# Patient Record
Sex: MALE | Race: WHITE | NOT HISPANIC OR LATINO | Employment: FULL TIME | URBAN - METROPOLITAN AREA
[De-identification: names, ages, dates, MRNs, and addresses within clinical notes are randomized per-mention and may not be internally consistent; named-entity substitution may affect disease eponyms.]

---

## 2023-08-11 ENCOUNTER — HOSPITAL ENCOUNTER (OUTPATIENT)
Dept: DATA CONVERSION | Facility: HOSPITAL | Age: 63
Discharge: HOME | End: 2023-08-11

## 2023-08-11 DIAGNOSIS — L72.3 SEBACEOUS CYST: ICD-10-CM

## 2023-08-28 PROBLEM — I10 BENIGN ESSENTIAL HYPERTENSION: Status: ACTIVE | Noted: 2023-08-28

## 2023-08-28 PROBLEM — B00.89 HERPES SIMPLEX VIRUS TYPE 1 (HSV-1) DERMATITIS: Status: ACTIVE | Noted: 2023-08-28

## 2023-08-28 PROBLEM — L08.9 INFECTED SEBACEOUS CYST: Status: ACTIVE | Noted: 2023-08-28

## 2023-08-28 PROBLEM — E78.00 PURE HYPERCHOLESTEROLEMIA: Status: ACTIVE | Noted: 2023-08-28

## 2023-08-28 PROBLEM — N18.30 STAGE 3 CHRONIC KIDNEY DISEASE (MULTI): Status: ACTIVE | Noted: 2023-08-28

## 2023-08-28 PROBLEM — L71.9 ROSACEA: Status: ACTIVE | Noted: 2023-08-28

## 2023-08-28 PROBLEM — E04.1 NONTOXIC UNINODULAR GOITER: Status: ACTIVE | Noted: 2023-08-28

## 2023-08-28 PROBLEM — R73.9 HYPERGLYCEMIA: Status: ACTIVE | Noted: 2023-08-28

## 2023-08-28 PROBLEM — E03.8 SUBCLINICAL HYPOTHYROIDISM: Status: ACTIVE | Noted: 2023-08-28

## 2023-08-28 PROBLEM — B35.1 ONYCHOMYCOSIS OF TOENAIL: Status: ACTIVE | Noted: 2023-08-28

## 2023-08-28 PROBLEM — E78.5 HYPERLIPIDEMIA: Status: ACTIVE | Noted: 2023-08-28

## 2023-08-28 PROBLEM — D72.819 LEUKOPENIA: Status: ACTIVE | Noted: 2023-08-28

## 2023-08-28 PROBLEM — L72.3 INFECTED SEBACEOUS CYST: Status: ACTIVE | Noted: 2023-08-28

## 2023-08-28 PROBLEM — E87.5 HYPERKALEMIA: Status: ACTIVE | Noted: 2023-08-28

## 2023-08-28 PROBLEM — R79.9 ABNORMAL BLOOD CHEMISTRY: Status: ACTIVE | Noted: 2023-08-28

## 2023-08-28 PROBLEM — L72.0 EPIDERMAL INCLUSION CYST: Status: ACTIVE | Noted: 2023-08-28

## 2023-08-28 PROBLEM — N28.9 ABNORMAL RENAL FUNCTION: Status: ACTIVE | Noted: 2023-08-28

## 2023-08-28 PROBLEM — I10 HYPERTENSION: Status: ACTIVE | Noted: 2023-08-28

## 2023-08-28 PROBLEM — R79.89 ABNORMAL TSH: Status: ACTIVE | Noted: 2023-08-28

## 2023-08-28 PROBLEM — L60.0 INGROWN TOENAIL: Status: ACTIVE | Noted: 2023-08-28

## 2023-08-28 PROBLEM — R00.1 BRADYCARDIA: Status: ACTIVE | Noted: 2023-08-28

## 2023-08-28 PROBLEM — E78.1 HYPERTRIGLYCERIDEMIA: Status: ACTIVE | Noted: 2023-08-28

## 2023-08-28 RX ORDER — HYDROCHLOROTHIAZIDE 25 MG/1
25 TABLET ORAL DAILY
COMMUNITY
End: 2024-01-19 | Stop reason: SDUPTHER

## 2023-08-28 RX ORDER — LOSARTAN POTASSIUM 100 MG/1
TABLET ORAL
COMMUNITY
End: 2024-03-18 | Stop reason: SDUPTHER

## 2023-08-28 RX ORDER — TERBINAFINE HYDROCHLORIDE 250 MG/1
TABLET ORAL
COMMUNITY
Start: 2022-06-30

## 2023-08-28 RX ORDER — AMLODIPINE BESYLATE 10 MG/1
2 TABLET ORAL DAILY
COMMUNITY
Start: 2015-01-07

## 2023-08-28 RX ORDER — LISINOPRIL 10 MG/1
1 TABLET ORAL DAILY
COMMUNITY
Start: 2019-12-12

## 2023-08-28 RX ORDER — VALACYCLOVIR HYDROCHLORIDE 500 MG/1
1 TABLET, FILM COATED ORAL 2 TIMES DAILY
COMMUNITY
Start: 2018-11-06

## 2023-08-28 RX ORDER — VALACYCLOVIR HYDROCHLORIDE 1 G/1
1000 TABLET, FILM COATED ORAL DAILY
COMMUNITY
Start: 2020-09-25

## 2023-08-28 RX ORDER — AMLODIPINE BESYLATE 5 MG/1
5 TABLET ORAL 2 TIMES DAILY
COMMUNITY
Start: 2022-11-03

## 2023-08-28 RX ORDER — METOPROLOL TARTRATE 50 MG/1
50 TABLET ORAL DAILY
COMMUNITY
Start: 2014-03-12 | End: 2024-03-18 | Stop reason: SDUPTHER

## 2023-08-28 RX ORDER — METRONIDAZOLE 7.5 MG/G
GEL TOPICAL
COMMUNITY
Start: 2022-06-30 | End: 2023-10-19 | Stop reason: ALTCHOICE

## 2023-08-28 RX ORDER — FENOFIBRATE 54 MG/1
1 TABLET ORAL DAILY
COMMUNITY
Start: 2017-01-14

## 2023-08-28 RX ORDER — ATORVASTATIN CALCIUM 20 MG/1
TABLET, FILM COATED ORAL
COMMUNITY

## 2023-09-15 VITALS
DIASTOLIC BLOOD PRESSURE: 71 MMHG | WEIGHT: 196.4 LBS | OXYGEN SATURATION: 99 % | SYSTOLIC BLOOD PRESSURE: 112 MMHG | HEIGHT: 70 IN | HEART RATE: 43 BPM | TEMPERATURE: 97.2 F | BODY MASS INDEX: 28.12 KG/M2

## 2023-10-19 ENCOUNTER — OFFICE VISIT (OUTPATIENT)
Dept: PRIMARY CARE | Facility: CLINIC | Age: 63
End: 2023-10-19
Payer: MEDICARE

## 2023-10-19 VITALS
DIASTOLIC BLOOD PRESSURE: 70 MMHG | WEIGHT: 200 LBS | RESPIRATION RATE: 20 BRPM | OXYGEN SATURATION: 98 % | TEMPERATURE: 98.8 F | SYSTOLIC BLOOD PRESSURE: 132 MMHG | BODY MASS INDEX: 29.62 KG/M2 | HEIGHT: 69 IN | HEART RATE: 71 BPM

## 2023-10-19 DIAGNOSIS — B35.1 ONYCHOMYCOSIS: ICD-10-CM

## 2023-10-19 DIAGNOSIS — L02.212 ABSCESS OF BACK: Primary | ICD-10-CM

## 2023-10-19 PROCEDURE — 3078F DIAST BP <80 MM HG: CPT | Performed by: FAMILY MEDICINE

## 2023-10-19 PROCEDURE — 99213 OFFICE O/P EST LOW 20 MIN: CPT | Performed by: FAMILY MEDICINE

## 2023-10-19 PROCEDURE — 1036F TOBACCO NON-USER: CPT | Performed by: FAMILY MEDICINE

## 2023-10-19 PROCEDURE — 3075F SYST BP GE 130 - 139MM HG: CPT | Performed by: FAMILY MEDICINE

## 2023-10-19 RX ORDER — SULFAMETHOXAZOLE AND TRIMETHOPRIM 800; 160 MG/1; MG/1
1 TABLET ORAL 2 TIMES DAILY
Qty: 14 TABLET | Refills: 0 | Status: SHIPPED | OUTPATIENT
Start: 2023-10-19 | End: 2023-10-26

## 2023-10-19 ASSESSMENT — PAIN SCALES - GENERAL: PAINLEVEL: 0-NO PAIN

## 2023-10-19 NOTE — PROGRESS NOTES
"Subjective   Patient ID: Altaf Gaming is a 63 y.o. male who presents for Cyst (PATIENT HAS A CYST ON HIS BACK HE WOULD LIKE LOOKED AT).    HPI He has had some recent redness and swelling of back and slight dc    Review of Systems   Constitutional:  Negative for fever.   Respiratory: Negative.     Skin:         See HPI       Objective   /70 (BP Location: Left arm, Patient Position: Sitting, BP Cuff Size: Adult)   Pulse 71   Temp 37.1 °C (98.8 °F) (Skin)   Resp 20   Ht 1.753 m (5' 9\")   Wt 90.7 kg (200 lb)   SpO2 98%   BMI 29.53 kg/m²     Physical Exam  Skin:     Comments: Mid back with small area of healing abscess with no fluctuance.           Assessment/Plan   Problem List Items Addressed This Visit    None  Visit Diagnoses         Codes    Abscess of back    -  Primary L02.212    Onychomycosis     B35.1          Will observe for self resolution and follow up if any increased symptoms.      "

## 2023-10-21 ASSESSMENT — ENCOUNTER SYMPTOMS
RESPIRATORY NEGATIVE: 1
ROS SKIN COMMENTS: SEE HPI
FEVER: 0

## 2023-11-02 ENCOUNTER — OFFICE VISIT (OUTPATIENT)
Dept: CARDIOLOGY | Facility: CLINIC | Age: 63
End: 2023-11-02
Payer: MEDICARE

## 2023-11-02 VITALS
OXYGEN SATURATION: 98 % | BODY MASS INDEX: 28.94 KG/M2 | WEIGHT: 196 LBS | SYSTOLIC BLOOD PRESSURE: 128 MMHG | HEART RATE: 45 BPM | DIASTOLIC BLOOD PRESSURE: 82 MMHG

## 2023-11-02 DIAGNOSIS — I10 BENIGN ESSENTIAL HYPERTENSION: ICD-10-CM

## 2023-11-02 DIAGNOSIS — E78.00 PURE HYPERCHOLESTEROLEMIA: Primary | ICD-10-CM

## 2023-11-02 PROCEDURE — 3074F SYST BP LT 130 MM HG: CPT | Performed by: INTERNAL MEDICINE

## 2023-11-02 PROCEDURE — 99213 OFFICE O/P EST LOW 20 MIN: CPT | Performed by: INTERNAL MEDICINE

## 2023-11-02 PROCEDURE — 1036F TOBACCO NON-USER: CPT | Performed by: INTERNAL MEDICINE

## 2023-11-02 PROCEDURE — 3079F DIAST BP 80-89 MM HG: CPT | Performed by: INTERNAL MEDICINE

## 2023-11-02 ASSESSMENT — PAIN SCALES - GENERAL: PAINLEVEL: 0-NO PAIN

## 2023-11-02 NOTE — PROGRESS NOTES
Subjective      Chief Complaint   Patient presents with    annual appt           Mr. Gaming is a patient we follow once a year who has high blood pressure and hypercholesterolemia.  He remains active and will ex.  He is not complaining of chest discomfort.  NO PND or orthopnea.  The legs are not swelling on him.  He does not complain of palpitations.   The BP is doing well  The chol was done a year ago and is doing well           ROS     Past Surgical History:   Procedure Laterality Date    CYST REMOVAL  2023    Dr. 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 08/28/2023    Hyperglycemia 08/28/2023    Hyperkalemia 08/28/2023    Hyperlipidemia 08/28/2023    Hypertriglyceridemia 08/28/2023    Hypertension 08/28/2023    Infected sebaceous cyst 08/28/2023    Ingrown toenail 08/28/2023    Leukopenia 08/28/2023    Nontoxic uninodular goiter 08/28/2023    Onychomycosis of toenail 08/28/2023    Pure hypercholesterolemia 08/28/2023    Rosacea 08/28/2023    Stage 3 chronic kidney disease (CMS/HCC) 08/28/2023    Subclinical hypothyroidism 08/28/2023     Resolved Ambulatory Problems     Diagnosis Date Noted    No Resolved Ambulatory Problems     Past Medical History:   Diagnosis Date    Encounter for screening for malignant neoplasm of prostate     Other specified abnormal findings of blood chemistry 07/13/2016    Personal history of other diseases of the circulatory system     Personal history of other diseases of the circulatory system     Personal history of other diseases of the digestive system     Personal history of other endocrine,  "nutritional and metabolic disease         Visit Vitals  /82   Pulse (!) 45   Wt 88.9 kg (196 lb)   SpO2 98%   BMI 28.94 kg/m²   Smoking Status Never   BSA 2.08 m²        Objective     Constitutional:       Appearance: Healthy appearance.   Eyes:      Pupils: Pupils are equal, round, and reactive to light.   Neck:      Vascular: JVD normal.   Pulmonary:      Breath sounds: Normal breath sounds.   Cardiovascular:      PMI at left midclavicular line. Normal rate. Regular rhythm. Normal S1. Normal S2.       Murmurs: There is no murmur.      No gallop.  No click. No rub.   Pulses:     Intact distal pulses.   Edema:     Peripheral edema absent.   Abdominal:      Palpations: Abdomen is soft.      Tenderness: There is no abdominal tenderness.   Musculoskeletal:      Extremities: No clubbing present.Skin:     General: Skin is warm and dry.   Neurological:      General: No focal deficit present.            Lab Review:         Lab Results   Component Value Date    CHOL 133 11/02/2022    CHOL 118 (L) 10/05/2020    CHOL 147 01/04/2019     Lab Results   Component Value Date    HDL 49 11/02/2022    HDL 47 10/05/2020    HDL 48.0 01/04/2019     Lab Results   Component Value Date    LDLCALC 70 11/02/2022    LDLCALC 56 (L) 10/05/2020     Lab Results   Component Value Date    TRIG 70 11/02/2022    TRIG 73 10/05/2020    TRIG 52 01/04/2019     No components found for: \"CHOLHDL\"     Assessment/Plan     Benign essential hypertension  He is doing well and is active.  NO angina and no chf  The BP is doing well    Pure hypercholesterolemia  Is doing well and needs to be checked     "

## 2024-01-19 DIAGNOSIS — I10 BENIGN ESSENTIAL HYPERTENSION: Primary | ICD-10-CM

## 2024-01-21 RX ORDER — HYDROCHLOROTHIAZIDE 25 MG/1
25 TABLET ORAL DAILY
Qty: 90 TABLET | Refills: 3 | Status: SHIPPED | OUTPATIENT
Start: 2024-01-21 | End: 2024-01-22 | Stop reason: SDUPTHER

## 2024-01-22 DIAGNOSIS — I10 BENIGN ESSENTIAL HYPERTENSION: ICD-10-CM

## 2024-01-22 RX ORDER — HYDROCHLOROTHIAZIDE 25 MG/1
25 TABLET ORAL DAILY
Qty: 90 TABLET | Refills: 3 | Status: SHIPPED | OUTPATIENT
Start: 2024-01-22

## 2024-03-18 DIAGNOSIS — I10 BENIGN ESSENTIAL HYPERTENSION: Primary | ICD-10-CM

## 2024-03-18 RX ORDER — LOSARTAN POTASSIUM 100 MG/1
100 TABLET ORAL DAILY
Qty: 30 TABLET | Refills: 0 | Status: SHIPPED | OUTPATIENT
Start: 2024-03-18 | End: 2024-04-09

## 2024-03-18 RX ORDER — METOPROLOL TARTRATE 50 MG/1
50 TABLET ORAL DAILY
Qty: 30 TABLET | Refills: 0 | Status: SHIPPED | OUTPATIENT
Start: 2024-03-18 | End: 2024-04-09

## 2024-04-05 DIAGNOSIS — I10 BENIGN ESSENTIAL HYPERTENSION: ICD-10-CM

## 2024-04-09 RX ORDER — LOSARTAN POTASSIUM 100 MG/1
100 TABLET ORAL DAILY
Qty: 90 TABLET | Refills: 3 | Status: SHIPPED | OUTPATIENT
Start: 2024-04-09 | End: 2025-04-09

## 2024-04-09 RX ORDER — METOPROLOL TARTRATE 50 MG/1
50 TABLET ORAL 2 TIMES DAILY
Qty: 180 TABLET | Refills: 3 | Status: SHIPPED | OUTPATIENT
Start: 2024-04-09 | End: 2025-04-09

## 2024-04-16 NOTE — TELEPHONE ENCOUNTER
Patient left message on script line to refill amlodipine. There are 2 different dosages in his chart. Will need to know dose and frequency. Left message for call back

## 2024-04-18 DIAGNOSIS — I10 BENIGN ESSENTIAL HYPERTENSION: Primary | ICD-10-CM

## 2024-07-03 RX ORDER — AMLODIPINE BESYLATE 10 MG/1
10 TABLET ORAL DAILY
Qty: 90 TABLET | Refills: 0 | Status: SHIPPED | OUTPATIENT
Start: 2024-07-03

## 2024-08-05 DIAGNOSIS — E78.00 PURE HYPERCHOLESTEROLEMIA: Primary | ICD-10-CM

## 2024-08-05 RX ORDER — ATORVASTATIN CALCIUM 20 MG/1
20 TABLET, FILM COATED ORAL DAILY
Qty: 90 TABLET | Refills: 3 | Status: SHIPPED | OUTPATIENT
Start: 2024-08-05 | End: 2025-08-05

## 2024-12-09 ENCOUNTER — TELEPHONE (OUTPATIENT)
Dept: CARDIOLOGY | Facility: CLINIC | Age: 64
End: 2024-12-09

## 2024-12-09 ENCOUNTER — LAB (OUTPATIENT)
Dept: LAB | Facility: LAB | Age: 64
End: 2024-12-09
Payer: COMMERCIAL

## 2024-12-09 DIAGNOSIS — E78.00 PURE HYPERCHOLESTEROLEMIA: Primary | ICD-10-CM

## 2024-12-10 ENCOUNTER — OFFICE VISIT (OUTPATIENT)
Dept: PRIMARY CARE | Facility: CLINIC | Age: 64
End: 2024-12-10
Payer: COMMERCIAL

## 2024-12-10 VITALS
RESPIRATION RATE: 18 BRPM | BODY MASS INDEX: 29.68 KG/M2 | SYSTOLIC BLOOD PRESSURE: 132 MMHG | WEIGHT: 201 LBS | DIASTOLIC BLOOD PRESSURE: 82 MMHG | HEART RATE: 64 BPM | TEMPERATURE: 98.2 F | OXYGEN SATURATION: 100 %

## 2024-12-10 DIAGNOSIS — R73.9 HYPERGLYCEMIA: ICD-10-CM

## 2024-12-10 DIAGNOSIS — Z12.5 SCREENING FOR MALIGNANT NEOPLASM OF PROSTATE: ICD-10-CM

## 2024-12-10 DIAGNOSIS — Z12.11 SCREENING FOR COLON CANCER: ICD-10-CM

## 2024-12-10 DIAGNOSIS — G62.9 NEUROPATHY: Primary | ICD-10-CM

## 2024-12-10 LAB
ALBUMIN SERPL BCP-MCNC: 4.3 G/DL (ref 3.4–5)
ALP SERPL-CCNC: 82 U/L (ref 33–136)
ALT SERPL W P-5'-P-CCNC: 29 U/L (ref 10–52)
ANION GAP SERPL CALCULATED.3IONS-SCNC: 10 MMOL/L (ref 10–20)
AST SERPL W P-5'-P-CCNC: 25 U/L (ref 9–39)
BASOPHILS # BLD AUTO: 0.08 X10*3/UL (ref 0–0.1)
BASOPHILS NFR BLD AUTO: 1.4 %
BILIRUB SERPL-MCNC: 1.4 MG/DL (ref 0–1.2)
BUN SERPL-MCNC: 14 MG/DL (ref 6–23)
CALCIUM SERPL-MCNC: 9.5 MG/DL (ref 8.6–10.3)
CHLORIDE SERPL-SCNC: 99 MMOL/L (ref 98–107)
CO2 SERPL-SCNC: 29 MMOL/L (ref 21–32)
CREAT SERPL-MCNC: 1.11 MG/DL (ref 0.5–1.3)
EGFRCR SERPLBLD CKD-EPI 2021: 74 ML/MIN/1.73M*2
EOSINOPHIL # BLD AUTO: 0.25 X10*3/UL (ref 0–0.7)
EOSINOPHIL NFR BLD AUTO: 4.4 %
ERYTHROCYTE [DISTWIDTH] IN BLOOD BY AUTOMATED COUNT: 11.8 % (ref 11.5–14.5)
GLUCOSE SERPL-MCNC: 118 MG/DL (ref 74–99)
HCT VFR BLD AUTO: 38.2 % (ref 41–52)
HGB BLD-MCNC: 13.8 G/DL (ref 13.5–17.5)
IMM GRANULOCYTES # BLD AUTO: 0.01 X10*3/UL (ref 0–0.7)
IMM GRANULOCYTES NFR BLD AUTO: 0.2 % (ref 0–0.9)
LYMPHOCYTES # BLD AUTO: 1.77 X10*3/UL (ref 1.2–4.8)
LYMPHOCYTES NFR BLD AUTO: 31.2 %
MCH RBC QN AUTO: 33.7 PG (ref 26–34)
MCHC RBC AUTO-ENTMCNC: 36.1 G/DL (ref 32–36)
MCV RBC AUTO: 93 FL (ref 80–100)
MONOCYTES # BLD AUTO: 0.56 X10*3/UL (ref 0.1–1)
MONOCYTES NFR BLD AUTO: 9.9 %
NEUTROPHILS # BLD AUTO: 3.01 X10*3/UL (ref 1.2–7.7)
NEUTROPHILS NFR BLD AUTO: 52.9 %
NRBC BLD-RTO: 0 /100 WBCS (ref 0–0)
PLATELET # BLD AUTO: 255 X10*3/UL (ref 150–450)
POTASSIUM SERPL-SCNC: 4 MMOL/L (ref 3.5–5.3)
PROT SERPL-MCNC: 6.7 G/DL (ref 6.4–8.2)
RBC # BLD AUTO: 4.1 X10*6/UL (ref 4.5–5.9)
SODIUM SERPL-SCNC: 134 MMOL/L (ref 136–145)
TSH SERPL-ACNC: 3.15 MIU/L (ref 0.44–3.98)
WBC # BLD AUTO: 5.7 X10*3/UL (ref 4.4–11.3)

## 2024-12-10 PROCEDURE — G2211 COMPLEX E/M VISIT ADD ON: HCPCS | Performed by: FAMILY MEDICINE

## 2024-12-10 PROCEDURE — 80053 COMPREHEN METABOLIC PANEL: CPT | Performed by: FAMILY MEDICINE

## 2024-12-10 PROCEDURE — 85025 COMPLETE CBC W/AUTO DIFF WBC: CPT | Performed by: FAMILY MEDICINE

## 2024-12-10 PROCEDURE — 1158F ADVNC CARE PLAN TLK DOCD: CPT | Performed by: FAMILY MEDICINE

## 2024-12-10 PROCEDURE — 1123F ACP DISCUSS/DSCN MKR DOCD: CPT | Performed by: FAMILY MEDICINE

## 2024-12-10 PROCEDURE — 1159F MED LIST DOCD IN RCRD: CPT | Performed by: FAMILY MEDICINE

## 2024-12-10 PROCEDURE — 1126F AMNT PAIN NOTED NONE PRSNT: CPT | Performed by: FAMILY MEDICINE

## 2024-12-10 PROCEDURE — 3075F SYST BP GE 130 - 139MM HG: CPT | Performed by: FAMILY MEDICINE

## 2024-12-10 PROCEDURE — 99213 OFFICE O/P EST LOW 20 MIN: CPT | Performed by: FAMILY MEDICINE

## 2024-12-10 PROCEDURE — 3079F DIAST BP 80-89 MM HG: CPT | Performed by: FAMILY MEDICINE

## 2024-12-10 PROCEDURE — 82607 VITAMIN B-12: CPT | Performed by: FAMILY MEDICINE

## 2024-12-10 PROCEDURE — 84443 ASSAY THYROID STIM HORMONE: CPT | Performed by: FAMILY MEDICINE

## 2024-12-10 PROCEDURE — 83036 HEMOGLOBIN GLYCOSYLATED A1C: CPT | Mod: WESLAB | Performed by: FAMILY MEDICINE

## 2024-12-10 PROCEDURE — 84153 ASSAY OF PSA TOTAL: CPT | Performed by: FAMILY MEDICINE

## 2024-12-10 ASSESSMENT — PAIN SCALES - GENERAL: PAINLEVEL_OUTOF10: 0-NO PAIN

## 2024-12-10 NOTE — PROGRESS NOTES
Subjective   Patient ID: Altaf Gaming is a 64 y.o. male who presents for Pain (Pt has been having pain in both feet on and off for the past few weeks. ).    HPI for about 6 wks he had been getting  Numbness in his feet . Improved somewhat currently.    Review of Systemssee HPI    Objective   /82   Pulse 64   Temp 36.8 °C (98.2 °F)   Resp 18   Wt 91.2 kg (201 lb)   SpO2 100%   BMI 29.68 kg/m²     Physical Exam  Constitutional:       General: He is not in acute distress.     Appearance: Normal appearance.   Cardiovascular:      Rate and Rhythm: Normal rate and regular rhythm.      Heart sounds: No murmur heard.  Pulmonary:      Breath sounds: Normal breath sounds. No wheezing.   Neurological:      Mental Status: He is alert.         Assessment/Plan   Problem List Items Addressed This Visit             ICD-10-CM    Hyperglycemia R73.9    Relevant Orders    Hemoglobin A1C (Completed)     Other Visit Diagnoses         Codes    Neuropathy    -  Primary G62.9    Relevant Orders    Vitamin B12 (Completed)    CBC and Auto Differential (Completed)    Comprehensive Metabolic Panel (Completed)    TSH with reflex to Free T4 if abnormal (Completed)    Screening for malignant neoplasm of prostate     Z12.5    Relevant Orders    Prostate Specific Antigen (Completed)    Screening for colon cancer     Z12.11    Relevant Orders    Referral to Gastroenterology          Follow up if symptoms recur.

## 2024-12-11 ENCOUNTER — LAB (OUTPATIENT)
Dept: LAB | Facility: LAB | Age: 64
End: 2024-12-11
Payer: COMMERCIAL

## 2024-12-11 DIAGNOSIS — E78.00 PURE HYPERCHOLESTEROLEMIA: ICD-10-CM

## 2024-12-11 LAB
CHOLEST SERPL-MCNC: 107 MG/DL (ref 0–199)
CHOLESTEROL/HDL RATIO: 2.2
EST. AVERAGE GLUCOSE BLD GHB EST-MCNC: 100 MG/DL
HBA1C MFR BLD: 5.1 %
HDLC SERPL-MCNC: 49.7 MG/DL
LDLC SERPL CALC-MCNC: 43 MG/DL
NON HDL CHOLESTEROL: 57 MG/DL (ref 0–149)
PSA SERPL-MCNC: 0.36 NG/ML
TRIGL SERPL-MCNC: 73 MG/DL (ref 0–149)
VIT B12 SERPL-MCNC: 624 PG/ML (ref 211–911)
VLDL: 15 MG/DL (ref 0–40)

## 2024-12-11 PROCEDURE — 36415 COLL VENOUS BLD VENIPUNCTURE: CPT

## 2024-12-22 NOTE — PROGRESS NOTES
uwSubjective      Chief Complaint   Patient presents with    Yearly follow up          Mr. Gaming is a patient we follow once a year who has high blood pressure and hypercholesterolemia  Is doing well and is active.  The BP is up today and has been good at other offices  He may have neuropathy in the feet.  The chol is doing well           Review of Systems   Constitutional: Negative. Negative for chills and fever.   HENT: Negative.     Eyes: Negative.    Respiratory: Negative.  Negative for cough and shortness of breath.    Endocrine: Negative.    Skin: Negative.    Musculoskeletal: Negative.  Negative for falls.   Gastrointestinal: Negative.    Genitourinary: Negative.    Neurological: Negative.    All other systems reviewed and are negative.       Past Surgical History:   Procedure Laterality Date    CYST REMOVAL  2023    Dr. 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 08/28/2023    Hyperglycemia 08/28/2023    Hyperkalemia 08/28/2023    Hyperlipidemia 08/28/2023    Hypertriglyceridemia 08/28/2023    Hypertension 08/28/2023    Infected sebaceous cyst 08/28/2023    Ingrown toenail 08/28/2023    Leukopenia 08/28/2023    Nontoxic uninodular goiter 08/28/2023    Onychomycosis of toenail 08/28/2023    Pure hypercholesterolemia 08/28/2023    Rosacea 08/28/2023    Stage 3 chronic kidney disease (Multi) 08/28/2023    Subclinical hypothyroidism 08/28/2023     Resolved Ambulatory Problems     Diagnosis Date Noted    No Resolved Ambulatory Problems     Past Medical History:   Diagnosis Date    Encounter for screening for malignant neoplasm of  "prostate     Other specified abnormal findings of blood chemistry 07/13/2016    Personal history of other diseases of the circulatory system     Personal history of other diseases of the circulatory system     Personal history of other diseases of the digestive system     Personal history of other endocrine, nutritional and metabolic disease         Visit Vitals  /80   Pulse (!) 46   Wt 89.8 kg (198 lb)   SpO2 96%   BMI 29.24 kg/m²   Smoking Status Never   BSA 2.09 m²        Objective     Constitutional:       Appearance: Healthy appearance.   Eyes:      Pupils: Pupils are equal, round, and reactive to light.   Neck:      Vascular: No JVR. JVD normal.   Pulmonary:      Effort: Pulmonary effort is normal.      Breath sounds: Normal breath sounds.   Cardiovascular:      PMI at left midclavicular line. Normal rate. Regular rhythm. Normal S1. Normal S2.       Murmurs: There is no murmur.      No gallop.  No click. No rub.   Pulses:     Intact distal pulses.   Edema:     Peripheral edema absent.   Abdominal:      Palpations: Abdomen is soft.      Tenderness: There is no abdominal tenderness.   Musculoskeletal: Normal range of motion.      Extremities: No clubbing present.Skin:     General: Skin is warm and dry.   Neurological:      General: No focal deficit present.            Lab Review:         Lab Results   Component Value Date    CHOL 107 12/11/2024    CHOL 133 11/02/2022    CHOL 118 (L) 10/05/2020     Lab Results   Component Value Date    HDL 49.7 12/11/2024    HDL 49 11/02/2022    HDL 47 10/05/2020     Lab Results   Component Value Date    LDLCALC 43 12/11/2024    LDLCALC 70 11/02/2022    LDLCALC 56 (L) 10/05/2020     Lab Results   Component Value Date    TRIG 73 12/11/2024    TRIG 70 11/02/2022    TRIG 73 10/05/2020     No components found for: \"CHOLHDL\"     Assessment/Plan     Hypertension  He is doing well and no angina and no chf.  The BP is doing well is mildly up today    Hyperlipidemia  Is doing well   "

## 2024-12-23 ENCOUNTER — OFFICE VISIT (OUTPATIENT)
Dept: CARDIOLOGY | Facility: CLINIC | Age: 64
End: 2024-12-23
Payer: COMMERCIAL

## 2024-12-23 VITALS
OXYGEN SATURATION: 96 % | BODY MASS INDEX: 29.24 KG/M2 | SYSTOLIC BLOOD PRESSURE: 142 MMHG | WEIGHT: 198 LBS | HEART RATE: 46 BPM | DIASTOLIC BLOOD PRESSURE: 80 MMHG

## 2024-12-23 DIAGNOSIS — E78.5 HYPERLIPIDEMIA, UNSPECIFIED HYPERLIPIDEMIA TYPE: ICD-10-CM

## 2024-12-23 DIAGNOSIS — I10 PRIMARY HYPERTENSION: Primary | ICD-10-CM

## 2024-12-23 PROCEDURE — 99213 OFFICE O/P EST LOW 20 MIN: CPT | Performed by: INTERNAL MEDICINE

## 2024-12-23 PROCEDURE — 1126F AMNT PAIN NOTED NONE PRSNT: CPT | Performed by: INTERNAL MEDICINE

## 2024-12-23 PROCEDURE — 1036F TOBACCO NON-USER: CPT | Performed by: INTERNAL MEDICINE

## 2024-12-23 PROCEDURE — 3077F SYST BP >= 140 MM HG: CPT | Performed by: INTERNAL MEDICINE

## 2024-12-23 PROCEDURE — 1159F MED LIST DOCD IN RCRD: CPT | Performed by: INTERNAL MEDICINE

## 2024-12-23 PROCEDURE — 1123F ACP DISCUSS/DSCN MKR DOCD: CPT | Performed by: INTERNAL MEDICINE

## 2024-12-23 PROCEDURE — 3079F DIAST BP 80-89 MM HG: CPT | Performed by: INTERNAL MEDICINE

## 2024-12-23 ASSESSMENT — PATIENT HEALTH QUESTIONNAIRE - PHQ9
1. LITTLE INTEREST OR PLEASURE IN DOING THINGS: NOT AT ALL
1. LITTLE INTEREST OR PLEASURE IN DOING THINGS: NOT AT ALL
SUM OF ALL RESPONSES TO PHQ9 QUESTIONS 1 AND 2: 0
2. FEELING DOWN, DEPRESSED OR HOPELESS: NOT AT ALL
SUM OF ALL RESPONSES TO PHQ9 QUESTIONS 1 AND 2: 0
2. FEELING DOWN, DEPRESSED OR HOPELESS: NOT AT ALL

## 2024-12-23 ASSESSMENT — ENCOUNTER SYMPTOMS
SHORTNESS OF BREATH: 0
DEPRESSION: 0
COUGH: 0
CONSTITUTIONAL NEGATIVE: 1
NEUROLOGICAL NEGATIVE: 1
EYES NEGATIVE: 1
OCCASIONAL FEELINGS OF UNSTEADINESS: 0
GASTROINTESTINAL NEGATIVE: 1
MUSCULOSKELETAL NEGATIVE: 1
FALLS: 0
ENDOCRINE NEGATIVE: 1
CHILLS: 0
RESPIRATORY NEGATIVE: 1
FEVER: 0
LOSS OF SENSATION IN FEET: 1

## 2024-12-23 ASSESSMENT — PAIN SCALES - GENERAL: PAINLEVEL_OUTOF10: 0-NO PAIN

## 2024-12-30 DIAGNOSIS — I10 BENIGN ESSENTIAL HYPERTENSION: ICD-10-CM

## 2024-12-30 RX ORDER — HYDROCHLOROTHIAZIDE 25 MG/1
25 TABLET ORAL DAILY
Qty: 90 TABLET | Refills: 3 | Status: SHIPPED | OUTPATIENT
Start: 2024-12-30

## 2024-12-30 NOTE — TELEPHONE ENCOUNTER
Requested Prescriptions     Pending Prescriptions Disp Refills    hydroCHLOROthiazide (HYDRODiuril) 25 mg tablet [Pharmacy Med Name: HYDROCHLOROTHIAZIDE TABS 25MG] 90 tablet 3     Sig: TAKE 1 TABLET DAILY

## 2025-02-07 DIAGNOSIS — I10 BENIGN ESSENTIAL HYPERTENSION: ICD-10-CM

## 2025-02-07 RX ORDER — AMLODIPINE BESYLATE 10 MG/1
10 TABLET ORAL DAILY
Qty: 90 TABLET | Refills: 0 | Status: SHIPPED | OUTPATIENT
Start: 2025-02-07

## 2025-02-12 DIAGNOSIS — I10 BENIGN ESSENTIAL HYPERTENSION: ICD-10-CM

## 2025-02-12 RX ORDER — AMLODIPINE BESYLATE 10 MG/1
10 TABLET ORAL DAILY
Qty: 90 TABLET | Refills: 3 | Status: SHIPPED | OUTPATIENT
Start: 2025-02-12 | End: 2026-02-12

## 2025-02-27 ENCOUNTER — TELEPHONE (OUTPATIENT)
Dept: PRIMARY CARE | Facility: CLINIC | Age: 65
End: 2025-02-27
Payer: MEDICARE

## 2025-02-27 DIAGNOSIS — M79.671 PAIN IN BOTH FEET: ICD-10-CM

## 2025-02-27 DIAGNOSIS — M79.672 PAIN IN BOTH FEET: ICD-10-CM

## 2025-03-06 ENCOUNTER — EVALUATION (OUTPATIENT)
Dept: PHYSICAL THERAPY | Facility: CLINIC | Age: 65
End: 2025-03-06
Payer: MEDICARE

## 2025-03-06 DIAGNOSIS — R29.3 POSTURE IMBALANCE: ICD-10-CM

## 2025-03-06 DIAGNOSIS — R20.2 COMPLAINT OF PARESTHESIA: Primary | ICD-10-CM

## 2025-03-06 DIAGNOSIS — M53.80 DECREASED RANGE OF MOTION OF SPINE: ICD-10-CM

## 2025-03-06 PROCEDURE — 97162 PT EVAL MOD COMPLEX 30 MIN: CPT | Mod: GP

## 2025-03-06 ASSESSMENT — ENCOUNTER SYMPTOMS
OCCASIONAL FEELINGS OF UNSTEADINESS: 0
DEPRESSION: 1
LOSS OF SENSATION IN FEET: 1

## 2025-03-06 NOTE — PROGRESS NOTES
Physical Therapy    Physical Therapy Evaluation and Treatment      Patient Name: Alatf Gaming  MRN: [id number]  Today's Date: 3/6/2025    Time Entry:   Time Calculation  Start Time: 0755  Stop Time: 0844  Time Calculation (min): 49 min  PT Evaluation Time Entry  PT Evaluation (Moderate) Time Entry: 49     Assessment:  Patient presents to PT with chief complaints of tingling, twitching in B feet and hands.  He also reports jerking movements  BLE's intermittently, and also various areas of body. Back pain is intermittent and rare per patient report.    Clinical exam reveals the following which may be contributing to symptoms:  -postural imbalances - notably increased thoracic kyphosis  -painful and restricted lumbar AROM - notably extension  -BLE muscle restrictions  -weakness in trunk extensors and scapular muscles.    Recommend further testing to rule out other pathology which may be causing symptoms before he starts with PT to address above deficits/impairments.     Plan:  Hold PT until he sees Neurologist. Await recommendation regarding further PT.   OP PT Plan  Treatment/Interventions: Education/ Instruction, Manual therapy, Therapeutic activities, Therapeutic exercises  PT Plan: Skilled PT  PT Frequency: 2 times per week  Duration: 4 weeks  Rehab Potential: Good  Plan of Care Agreement: Patient    Current Problem:   1. Complaint of paresthesia        2. Posture imbalance        3. Decreased range of motion of spine            General:  General  Reason for Referral: tingling in arms and legs  Referred By: Dr. Ed Murillo  Past Medical History Relevant to Rehab: Stage 3 chronic kidney disease, bradycardia, HTN  General Comment: Visit 1, Insurance:  20250: NO AUTH, 30.00 CO PAY, 100% COVERAGE, 3300 OOP, MMO    Subjective:   Chief Complaint: Patient presents to clinic due to complaints of tingling and twitching  in B hands and B feet, although he can have twitching anywhere in his body.  Pain is primarily in the  feet.  Diagnosed with neuropathy.  Patient reports no loss of muscle strength noticed during daily activities or weight lifting, but states his legs feel weak when he gets out of bed in the AM.  He has seen a chiropractor over the years.  Patient denies balance issues or unsteadiness when walking.  Denies falls. He occas dizziness,  but not recently. Reports he rarely has LBP. Scheduled for an appt with Neurologist on Monday. Also  has noticed some jerking. No recent changes with bowel or bladder function. Difficulty sleeping last night due to jerking motions.  Onset Date:  around Sept 2024  MARGI: sudden onset, no apparent reason. Started with zinging in right calf and foot and progressed from there.     Current Condition:   Worse  Tingling is   Aggravating Factors:  prolonged standing, dangling feet, weight bearing.  Relieving Factors:  elevation and getting off feet.       Functional limitations:  Prolonged standing and weight bearing causes increased tingling.     Patient goals:  Find out what is going on and determine if things can be ruled out. Possibly learn specific exercises to see if improvement is possible with tingling in hands and feet.     Relevant Information (PMH & Previous Tests/Imaging):   No recent diagnostic testing.   Previous Interventions/Treatments:   None      Prior Level of Function (PLOF)  Patient previously independent with all ADLs  Exercise/Physical Activity: weight training and cardio 4-5x wkk  Work/School:      Patients Living Environment: Reviewed and no concern  Lives with wife.    Primary Language: English    Red Flags/Review of Systems:  (-) osteoporosis  (-) cough/sneeze  (-) balance difficulties/FALLS  (+) numbness/tingling  (-) weakness  (+) unremitting night pain - at times  (+) AM stiffness  (-) unexplained weight loss  (-) history of cancer  (-) bowel or bladder changes  (-) saddle paresthesias  (-) pacemaker    Objective   Gait  Amb indep without assistive device, no  significant deviations, but demonstrates rounded shoulders and flexed thoracic posture.     Posture:   Poor sitting posture with increased spinal flexion, especially thoracic region.  Standing -  Min FH,  increased thoracic kyphosis, B rounded shoulders and protracted scapulae, swayback lumbar posture, knee varus.    Lumbar AROM/Symptoms :   Flexion min restriction;  increased thoracic flexion,  tingling B feet  Extension min restriction; stiffness during motion,  no increase  in tingling or pain  Rotation R WFL, no Sx  Rotation L  WFL, no Sx    Thoracic AROM/Symptoms:  Flexion WFL, no pain  Extension mod restriction, no pain  Rotation R min, no pain  Rotation L min, no pain    Repeated Lumbar Test Movements:  Rep Flexion  in standing x 10 reps, increased tingling in B Feet  Rep Extension in Standing x 10 reps, no change   Rep Flexion in lying x 10 reps, slight increase tingling in feet  Rep Extension in lying x 10 reps, no change but decreased motion in lumbar spine    Strength  Hip flexion sitting B 5/5  Hip flexion supine B 5/5  Bridge 100%  Hip Abduction B 5/5  Hip Extension B 5/5 but fair lumbar stabilization   Abdominals good   Dynamic Lumbar Stabilization  good  Weakness scapular stabilizers as evident by posture  Trunk extension fair  Knee Extension B 5/5  Knee Flexion B 5/5  Ankle DF  B 5/5  Ankle PF B 5/5  EHL B 5/5    ROM  B hip flexion WFL, ER WFL, IR ~ 50%    Flexibility  Piriformis B poor  Hamstrings R 55 deg, R 60 deg  Pectorals B fair    Neurological:   Motor deficit B WNL   Sensory deficit B LE's intact to light touch  Reflexes   -Patellar R/L brisk  -achilles R/L trace  -Babinski R/L normal  -clonus R/L negative    Dural Signs   -SLR R/L negative  -Slump Test R/L negative    Outcome Measures:  Other Measures  Oswestry Disablity Index (MCKENZIE): score 5, 10% disability        Treatments:  Eval only.    EDUCATION:  Outpatient Education  Individual(s) Educated: Patient  Education Provided: POC,  Posture  Risk and Benefits Discussed with Patient/Caregiver/Other: yes  Patient/Caregiver Demonstrated Understanding: yes  Plan of Care Discussed and Agreed Upon: yes    Goals:  Active       PT Problem       Paresthesias       Start:  03/06/25    Expected End:  05/01/25       Patient will report reduction of paresthesias by 75%  to ease performance of standing and walking.           ROM       Start:  03/06/25    Expected End:  05/01/25       Patient will demonstrate increased thoracolumbar ROM all planes without increase in symptoms to enhance ability to perform ADL's and functional activities.          Flexibility       Start:  03/06/25    Expected End:  05/01/25       Patient will demonstrate increased B  pectoral and B piriformis to Good  and B hamstrings to 70 deg  in order to decrease symptoms, improve positioning and/or promote improved functional mobility.          HEP       Start:  03/06/25    Expected End:  05/01/25       Patient will be independent and compliant with safe and recommended  HEP to enhance functional progress and long term management of condition.           Strength       Start:  03/06/25    Expected End:  05/01/25       Patient will increase scapular and trunk strength/dynamic lumbar stabilization by 1 MMT  to decrease symptoms,  enhance postural alignment and increase stamina for standing, walking and functional mobility.         Posture       Start:  03/06/25    Expected End:  05/01/25       Patient will demonstrate improved knowledge of correct posture by self correcting to neutral  alignment 75% of the time for optimum symptom management/reduction.

## 2025-03-15 DIAGNOSIS — I10 BENIGN ESSENTIAL HYPERTENSION: ICD-10-CM

## 2025-03-17 RX ORDER — METOPROLOL TARTRATE 50 MG/1
50 TABLET ORAL 2 TIMES DAILY
Qty: 180 TABLET | Refills: 3 | Status: SHIPPED | OUTPATIENT
Start: 2025-03-17

## 2025-03-20 ENCOUNTER — APPOINTMENT (OUTPATIENT)
Dept: PHYSICAL THERAPY | Facility: CLINIC | Age: 65
End: 2025-03-20
Payer: MEDICARE

## 2025-03-20 DIAGNOSIS — I10 BENIGN ESSENTIAL HYPERTENSION: ICD-10-CM

## 2025-03-20 RX ORDER — LOSARTAN POTASSIUM 100 MG/1
100 TABLET ORAL DAILY
Qty: 90 TABLET | Refills: 3 | Status: SHIPPED | OUTPATIENT
Start: 2025-03-20

## 2025-03-28 ENCOUNTER — TELEPHONE (OUTPATIENT)
Dept: PRIMARY CARE | Facility: CLINIC | Age: 65
End: 2025-03-28
Payer: MEDICARE

## 2025-03-28 DIAGNOSIS — Z83.2 FAMILY HISTORY OF PROTEIN C DEFICIENCY: ICD-10-CM

## 2025-04-07 ENCOUNTER — CLINICAL SUPPORT (OUTPATIENT)
Dept: SLEEP MEDICINE | Facility: CLINIC | Age: 65
End: 2025-04-07
Payer: MEDICARE

## 2025-04-07 DIAGNOSIS — G47.52 REM SLEEP BEHAVIOR DISORDER: ICD-10-CM

## 2025-04-08 VITALS
SYSTOLIC BLOOD PRESSURE: 173 MMHG | OXYGEN SATURATION: 98 % | WEIGHT: 198 LBS | HEIGHT: 69 IN | HEART RATE: 54 BPM | BODY MASS INDEX: 29.33 KG/M2 | DIASTOLIC BLOOD PRESSURE: 50 MMHG | TEMPERATURE: 96.3 F | RESPIRATION RATE: 18 BRPM

## 2025-04-08 ASSESSMENT — PAIN SCALES - GENERAL: PAINLEVEL_OUTOF10: 0-NO PAIN

## 2025-04-08 ASSESSMENT — SLEEP AND FATIGUE QUESTIONNAIRES
HOW LIKELY ARE YOU TO NOD OFF OR FALL ASLEEP WHILE LYING DOWN TO REST IN THE AFTERNOON WHEN CIRCUMSTANCES PERMIT: WOULD NEVER DOZE
HOW LIKELY ARE YOU TO NOD OFF OR FALL ASLEEP WHILE SITTING QUIETLY AFTER LUNCH WITHOUT ALCOHOL: WOULD NEVER DOZE
HOW LIKELY ARE YOU TO NOD OFF OR FALL ASLEEP WHILE SITTING AND READING: WOULD NEVER DOZE
ESS-CHAD TOTAL SCORE: 3
SITING INACTIVE IN A PUBLIC PLACE LIKE A CLASS ROOM OR A MOVIE THEATER: WOULD NEVER DOZE
ESS TOTAL SCORE: 3
HOW LIKELY ARE YOU TO NOD OFF OR FALL ASLEEP WHILE SITTING AND TALKING TO SOMEONE: SLIGHT CHANCE OF DOZING
HOW LIKELY ARE YOU TO NOD OFF OR FALL ASLEEP WHILE WATCHING TV: MODERATE CHANCE OF DOZING
HOW LIKELY ARE YOU TO NOD OFF OR FALL ASLEEP IN A CAR, WHILE STOPPED FOR A FEW MINUTES IN TRAFFIC: WOULD NEVER DOZE
HOW LIKELY ARE YOU TO NOD OFF OR FALL ASLEEP WHEN YOU ARE A PASSENGER IN A CAR FOR AN HOUR WITHOUT A BREAK: WOULD NEVER DOZE

## 2025-04-08 NOTE — PROGRESS NOTES
[facility] TECH NOTE:     Patient: Altaf Gaming   MRN//AGE: [id number]  1960  64 y.o.   Technologist: Luisa Glass   Room: 108   Service Date: 2025        Sleep Testing Location: [facility]: 3    TECHNOLOGIST SLEEP STUDY PROCEDURE NOTE:   This sleep study is being conducted according to the policies and procedures outlined by the AAS accreditation standards.  The sleep study procedure and processes involved during this appointment was explained to the patient/patient’s family, questions were answered. The patient/family verbalized understanding.      The patient is a 64 y.o. year old male scheduled for a Diagnostic PSG. He arrived for his appointment.      The study that was ultimately completed was a Diagnostic PSG.     The full study Was completed.  Patient questionnaires completed?: yes     Consents signed? yes    Initial Fall Risk Screening:     Altaf has not fallen in the last 6 months. Altaf does not have a fear of falling. He does not need assistance with sitting, standing, or walking. He does not need assistance walking in his home. He does not need assistance in an unfamiliar setting. The patient is not using an assistive device.     Brief Study observations: 64 year old male presents for a Diagnostic PSG. Patient woke two times to use the restroom throughout the night. Mild/intermittent snoring observed. Bradycardia observed. REM was not observed. Prolonged awakenings observed.     Deviation to order/protocol and reason: none      If PAP, which was preferred mask/pressure/mode: NA     Other:None    After the procedure, the patient/family was informed to ensure followup with ordering clinician for testing results.      Technologist: Luisa Glass

## 2025-04-28 ENCOUNTER — OFFICE VISIT (OUTPATIENT)
Dept: PRIMARY CARE | Facility: CLINIC | Age: 65
End: 2025-04-28
Payer: MEDICARE

## 2025-04-28 ENCOUNTER — ANCILLARY PROCEDURE (OUTPATIENT)
Dept: URGENT CARE | Age: 65
End: 2025-04-28
Payer: MEDICARE

## 2025-04-28 ENCOUNTER — OFFICE VISIT (OUTPATIENT)
Dept: URGENT CARE | Age: 65
End: 2025-04-28
Payer: MEDICARE

## 2025-04-28 VITALS
HEIGHT: 69 IN | BODY MASS INDEX: 29.03 KG/M2 | WEIGHT: 196 LBS | TEMPERATURE: 97.3 F | OXYGEN SATURATION: 98 % | DIASTOLIC BLOOD PRESSURE: 78 MMHG | HEART RATE: 51 BPM | SYSTOLIC BLOOD PRESSURE: 148 MMHG

## 2025-04-28 VITALS
DIASTOLIC BLOOD PRESSURE: 68 MMHG | RESPIRATION RATE: 20 BRPM | SYSTOLIC BLOOD PRESSURE: 121 MMHG | OXYGEN SATURATION: 98 % | HEART RATE: 65 BPM | TEMPERATURE: 98.4 F

## 2025-04-28 DIAGNOSIS — L57.0 ACTINIC KERATOSIS: ICD-10-CM

## 2025-04-28 DIAGNOSIS — S23.41XA SPRAIN OF COSTAL CARTILAGE, INITIAL ENCOUNTER: ICD-10-CM

## 2025-04-28 DIAGNOSIS — R20.2 PARESTHESIAS: ICD-10-CM

## 2025-04-28 DIAGNOSIS — S29.9XXA RIB INJURY: ICD-10-CM

## 2025-04-28 DIAGNOSIS — G47.30 MILD SLEEP APNEA: ICD-10-CM

## 2025-04-28 DIAGNOSIS — S29.9XXA RIB INJURY: Primary | ICD-10-CM

## 2025-04-28 DIAGNOSIS — D22.9 MELANOCYTIC NEVUS, UNSPECIFIED LOCATION: Primary | ICD-10-CM

## 2025-04-28 DIAGNOSIS — S20.212A CONTUSION OF RIB ON LEFT SIDE, INITIAL ENCOUNTER: ICD-10-CM

## 2025-04-28 DIAGNOSIS — E78.2 MIXED HYPERLIPIDEMIA: ICD-10-CM

## 2025-04-28 PROCEDURE — 1159F MED LIST DOCD IN RCRD: CPT

## 2025-04-28 PROCEDURE — 99213 OFFICE O/P EST LOW 20 MIN: CPT | Performed by: FAMILY MEDICINE

## 2025-04-28 PROCEDURE — 17271 DSTR MAL LES S/N/H/F/G 0.6-1: CPT | Performed by: FAMILY MEDICINE

## 2025-04-28 PROCEDURE — 1123F ACP DISCUSS/DSCN MKR DOCD: CPT | Performed by: FAMILY MEDICINE

## 2025-04-28 PROCEDURE — 1159F MED LIST DOCD IN RCRD: CPT | Performed by: FAMILY MEDICINE

## 2025-04-28 PROCEDURE — 3077F SYST BP >= 140 MM HG: CPT | Performed by: FAMILY MEDICINE

## 2025-04-28 PROCEDURE — 3078F DIAST BP <80 MM HG: CPT

## 2025-04-28 PROCEDURE — 3078F DIAST BP <80 MM HG: CPT | Performed by: FAMILY MEDICINE

## 2025-04-28 PROCEDURE — 3074F SYST BP LT 130 MM HG: CPT

## 2025-04-28 PROCEDURE — G2211 COMPLEX E/M VISIT ADD ON: HCPCS | Performed by: FAMILY MEDICINE

## 2025-04-28 PROCEDURE — 99203 OFFICE O/P NEW LOW 30 MIN: CPT

## 2025-04-28 PROCEDURE — 99070 SPECIAL SUPPLIES PHYS/QHP: CPT

## 2025-04-28 PROCEDURE — 11104 PUNCH BX SKIN SINGLE LESION: CPT | Performed by: FAMILY MEDICINE

## 2025-04-28 PROCEDURE — 17000 DESTRUCT PREMALG LESION: CPT | Performed by: FAMILY MEDICINE

## 2025-04-28 PROCEDURE — 1160F RVW MEDS BY RX/DR IN RCRD: CPT

## 2025-04-28 PROCEDURE — 1125F AMNT PAIN NOTED PAIN PRSNT: CPT

## 2025-04-28 PROCEDURE — 71101 X-RAY EXAM UNILAT RIBS/CHEST: CPT | Mod: LEFT SIDE

## 2025-04-28 PROCEDURE — 1036F TOBACCO NON-USER: CPT

## 2025-04-28 PROCEDURE — 1123F ACP DISCUSS/DSCN MKR DOCD: CPT

## 2025-04-28 PROCEDURE — 3008F BODY MASS INDEX DOCD: CPT | Performed by: FAMILY MEDICINE

## 2025-04-28 RX ORDER — NAPROXEN 500 MG/1
500 TABLET ORAL 2 TIMES DAILY PRN
Qty: 20 TABLET | Refills: 0 | Status: SHIPPED | OUTPATIENT
Start: 2025-04-28 | End: 2026-04-28

## 2025-04-28 RX ORDER — IBUPROFEN 800 MG/1
800 TABLET ORAL ONCE
Status: COMPLETED | OUTPATIENT
Start: 2025-04-28 | End: 2025-04-28

## 2025-04-28 RX ADMIN — IBUPROFEN 800 MG: 800 TABLET ORAL at 17:49

## 2025-04-28 ASSESSMENT — COLUMBIA-SUICIDE SEVERITY RATING SCALE - C-SSRS
2. HAVE YOU ACTUALLY HAD ANY THOUGHTS OF KILLING YOURSELF?: NO
6. HAVE YOU EVER DONE ANYTHING, STARTED TO DO ANYTHING, OR PREPARED TO DO ANYTHING TO END YOUR LIFE?: NO
1. IN THE PAST MONTH, HAVE YOU WISHED YOU WERE DEAD OR WISHED YOU COULD GO TO SLEEP AND NOT WAKE UP?: NO

## 2025-04-28 ASSESSMENT — PAIN SCALES - GENERAL: PAINLEVEL_OUTOF10: 8

## 2025-04-28 ASSESSMENT — ENCOUNTER SYMPTOMS
OCCASIONAL FEELINGS OF UNSTEADINESS: 0
LOSS OF SENSATION IN FEET: 0
LOSS OF SENSATION IN FEET: 0
DEPRESSION: 0
OCCASIONAL FEELINGS OF UNSTEADINESS: 0
DEPRESSION: 0

## 2025-04-28 ASSESSMENT — PATIENT HEALTH QUESTIONNAIRE - PHQ9
2. FEELING DOWN, DEPRESSED OR HOPELESS: NOT AT ALL
1. LITTLE INTEREST OR PLEASURE IN DOING THINGS: NOT AT ALL
SUM OF ALL RESPONSES TO PHQ9 QUESTIONS 1 AND 2: 0

## 2025-04-28 NOTE — PROGRESS NOTES
"Subjective   Patient ID: Altaf Gaming is a 64 y.o. male who presents for Procedure (Pt is here for a moll removal ).    HPI he has a dark lesion of upper left side of back to have evaluated .  Also red scaly lesion on anterior scalp.    He had been having a log of atypical paresthesia like symptoms of his legs and occasionally other areas and has been seeing neuro for this.  He had additional labs and EMG.  He did have some mildly abnormal bands on SPE and has upcoming hematology evaluation.  He also had sleep study showing mild sleep apnea only supine.      Review of Systems see HPI    Objective   /78   Pulse 51   Temp 36.3 °C (97.3 °F) (Temporal)   Ht 1.753 m (5' 9\")   Wt 88.9 kg (196 lb)   SpO2 98%   BMI 28.94 kg/m²     Physical Exam  Constitutional:       General: He is not in acute distress.     Appearance: Normal appearance.   Cardiovascular:      Rate and Rhythm: Normal rate and regular rhythm.      Heart sounds: No murmur heard.  Pulmonary:      Breath sounds: Normal breath sounds. No wheezing.   Skin:     Comments: Upper back with multiple brownish raised lesions consistent with SK.  Left upper back near other Sks with approx 5 mm macular dark brown lesion with slight central color variation.    Anterior mid scalp with reddish scaly lesion consistent with AK.     Neurological:      Mental Status: He is alert.         Assessment/Plan   Problem List Items Addressed This Visit           ICD-10-CM    Hyperlipidemia E78.5     Other Visit Diagnoses         Codes      Melanocytic nevus, unspecified location    -  Primary D22.9    Relevant Orders    Surgical Pathology Exam      Actinic keratosis     L57.0      Paresthesias     R20.2      Mild sleep apnea     G47.30          4 mm bx taken of back.  Path sent and will call with path results.  Follow up for suture removal 1 week.  Cryo applied to scalp lesion.   Discussed issues with pt regarding paresthesias.  He will finish workup with neuro and " hematology.  Work on side sleeping.  Patient ID: Altaf Gaming is a 64 y.o. male.    Destruction of lesion    Date/Time: 4/29/2025 6:00 AM    Performed by: Ed Murillo DO  Authorized by: Ed Murillo DO    Number of Lesions: 1  Lesion 1:     Body area: head/neck    Head/neck location: scalp    Initial size (mm): 8    Final defect size (mm): 8    Malignancy: malignancy unknown      Destruction method: cryotherapy    Incisional Biopsy    Date/Time: 4/29/2025 6:01 AM    Performed by: Ed Murillo DO  Authorized by: Ed Murillo DO    Consent:     Consent obtained:  Verbal    Consent given by:  Patient    Risks, benefits, and alternatives were discussed: yes    Universal protocol:     Patient identity confirmed:  Verbally with patient  Indications:     Indications:  Dark coloration  Pre-procedure details:     Skin preparation:  Povidone-iodine  Sedation:     Sedation type:  None  Anesthesia:     Anesthesia method:  Local infiltration    Local anesthetic:  Lidocaine 2% WITH epi  Procedure specific details:      4 mm bx taken and single suture place.   Post-procedure details:     Procedure completion:  Tolerated

## 2025-04-28 NOTE — PATIENT INSTRUCTIONS
Take medications as prescribed.  Maintain adequate hydration and nutrition.  Can also do over-the-counter lidocaine patches, Biofreeze, or Voltaren gel.  Can also use ice wrapped in cloth 15 to 20 minutes at a time several times throughout the day.  Please order an incentive spirometer to also use multiple times throughout the day to encourage deep breaths.  If symptoms worsen, do not improve, or any other concerning or worrisome symptoms develop please go to the emergency department.  Follow-up with PCP in 5 to 7 days for reassessment.

## 2025-04-28 NOTE — PROGRESS NOTES
"Subjective   Patient ID: Altaf Gaming is a 64 y.o. male. They present today with a chief complaint of Injury (Hurt his ribs while  dogs from fighting, thinks he is tasting blood happened 1 hr ago).    History of Present Illness  64-year-old male presents urgent care with wife for left rib pain started 1 hour ago prior to arrival.  States was breaking up a dog fight and fell on his left anterior lateral ribs onto the dog which she states was a pitbull.  Denies being bit by the dogs.  Denies head or neck or any other musculoskeletal injury or concern at this time.  Denies any shortness of breath, focal deficits, abdominal pain, nausea vomiting.  Radiologist impression of the left rib x-rays with chest x-ray states \"No definite rib fracture.\".  Gave ibuprofen in the urgent care as well as ice.  Prescribed naproxen.  Educated on supportive care and following up with his primary care provider.  Educated on incentive spirometer as well.  ER precautions.  Patient and patient's wife agree with plan.          Past Medical History  Allergies as of 04/28/2025    (No Known Allergies)       Prescriptions Prior to Admission[1]     Medical History[2]    Surgical History[3]     reports that he has never smoked. He has never been exposed to tobacco smoke. He has never used smokeless tobacco. He reports that he does not currently use alcohol. He reports that he does not use drugs.    Review of Systems  Review of Systems   All other systems reviewed and are negative.                                 Objective    Vitals:    04/28/25 1721   BP: 121/68   BP Location: Right arm   Patient Position: Sitting   BP Cuff Size: Adult   Pulse: 65   Resp: 20   Temp: 36.9 °C (98.4 °F)   TempSrc: Oral   SpO2: 98%     No LMP for male patient.    Physical Exam  Vitals reviewed.   Constitutional:       General: He is not in acute distress.     Appearance: Normal appearance. He is not ill-appearing, toxic-appearing or diaphoretic.   HENT:    "   Head: Normocephalic and atraumatic.      Nose: Nose normal.      Mouth/Throat:      Mouth: Mucous membranes are moist.      Pharynx: Oropharynx is clear.      Comments: Airway clear.  Cardiovascular:      Rate and Rhythm: Normal rate and regular rhythm.   Pulmonary:      Effort: Pulmonary effort is normal. No respiratory distress.      Breath sounds: Normal breath sounds. No stridor. No wheezing, rhonchi or rales.   Chest:      Chest wall: Tenderness (Left anterior/lateral rib pain.  No overlying skin changes.  No crepitus to palpation.) present.   Abdominal:      General: Abdomen is flat.      Palpations: Abdomen is soft.      Tenderness: There is no abdominal tenderness. There is no right CVA tenderness or left CVA tenderness.   Musculoskeletal:      Cervical back: Normal range of motion and neck supple. No rigidity or tenderness.      Comments: No midline C-spine, T-spine, L-spine tenderness.  Bilateral upper extremities range of motion, no tenderness, radial pulses intact and symmetrical.   Lymphadenopathy:      Cervical: No cervical adenopathy.   Skin:     General: Skin is warm and dry.   Neurological:      General: No focal deficit present.      Mental Status: He is alert and oriented to person, place, and time.   Psychiatric:         Mood and Affect: Mood normal.         Behavior: Behavior normal.         Procedures    Point of Care Test & Imaging Results from this visit  No results found for this visit on 04/28/25.   Imaging  No results found.    Cardiology, Vascular, and Other Imaging  No other imaging results found for the past 2 days      Diagnostic study results (if any) were reviewed by Tracy Land PA-C.    Assessment/Plan   Allergies, medications, history, and pertinent labs/EKGs/Imaging reviewed by Tracy Land PA-C.     Medical Decision Making  64-year-old male presents urgent care with wife for left rib pain started 1 hour ago prior to arrival.  States was breaking up a dog fight  "and fell on his left anterior lateral ribs onto the dog which she states was a pitbull.  Denies being bit by the dogs.  Denies head or neck or any other musculoskeletal injury or concern at this time.  Denies any shortness of breath, focal deficits, abdominal pain, nausea vomiting.  Radiologist impression of the left rib x-rays with chest x-ray states \"No definite rib fracture.\".  Gave ibuprofen in the urgent care as well as ice.  Prescribed naproxen.  Educated on supportive care and following up with his primary care provider.  Educated on incentive spirometer as well.  ER precautions.  Patient and patient's wife agree with plan.    Orders and Diagnoses  There are no diagnoses linked to this encounter.    Medical Admin Record      Patient disposition: Home    Electronically signed by Tracy Land PA-C  5:39 PM           [1] (Not in a hospital admission)  [2]   Past Medical History:  Diagnosis Date    Encounter for screening for malignant neoplasm of prostate     Screening PSA (prostate specific antigen)    Hyperlipidemia     Hypertension     Other specified abnormal findings of blood chemistry 07/13/2016    Elevated serum creatinine    Personal history of other diseases of the circulatory system     History of hypertension    Personal history of other diseases of the circulatory system     Personal history of cardiac murmur    Personal history of other diseases of the digestive system     History of gastroesophageal reflux (GERD)    Personal history of other endocrine, nutritional and metabolic disease     History of hyperlipidemia   [3]   Past Surgical History:  Procedure Laterality Date    CYST REMOVAL  2023    Dr. Barnard - cyst located on neck    ESOPHAGOGASTRODUODENOSCOPY  02/11/2014    Diagnostic Esophagogastroduodenoscopy     "

## 2025-05-02 ENCOUNTER — APPOINTMENT (OUTPATIENT)
Dept: PRIMARY CARE | Facility: CLINIC | Age: 65
End: 2025-05-02
Payer: MEDICARE

## 2025-05-16 ENCOUNTER — OFFICE VISIT (OUTPATIENT)
Dept: HEMATOLOGY/ONCOLOGY | Facility: CLINIC | Age: 65
End: 2025-05-16
Payer: MEDICARE

## 2025-05-16 VITALS
OXYGEN SATURATION: 97 % | HEART RATE: 47 BPM | TEMPERATURE: 97.5 F | RESPIRATION RATE: 17 BRPM | SYSTOLIC BLOOD PRESSURE: 126 MMHG | WEIGHT: 193.45 LBS | HEIGHT: 69 IN | DIASTOLIC BLOOD PRESSURE: 69 MMHG | BODY MASS INDEX: 28.65 KG/M2

## 2025-05-16 DIAGNOSIS — C43.9 MELANOMA OF SKIN (MULTI): ICD-10-CM

## 2025-05-16 DIAGNOSIS — Z83.2 FAMILY HISTORY OF PROTEIN C DEFICIENCY: ICD-10-CM

## 2025-05-16 DIAGNOSIS — R76.9 ABNORMAL IMMUNOLOGICAL FINDING IN SERUM, UNSPECIFIED: ICD-10-CM

## 2025-05-16 DIAGNOSIS — D47.2 GAMMOPATHY, MONOCLONAL: Primary | ICD-10-CM

## 2025-05-16 PROBLEM — N18.30 STAGE 3 CHRONIC KIDNEY DISEASE (MULTI): Status: RESOLVED | Noted: 2023-08-28 | Resolved: 2025-05-16

## 2025-05-16 LAB
ALBUMIN SERPL BCP-MCNC: 4.4 G/DL (ref 3.4–5)
ALP SERPL-CCNC: 78 U/L (ref 33–136)
ALT SERPL W P-5'-P-CCNC: 18 U/L (ref 10–52)
ANION GAP SERPL CALC-SCNC: 13 MMOL/L (ref 10–20)
AST SERPL W P-5'-P-CCNC: 21 U/L (ref 9–39)
BASOPHILS # BLD AUTO: 0.04 X10*3/UL (ref 0–0.1)
BASOPHILS NFR BLD AUTO: 0.8 %
BILIRUB SERPL-MCNC: 1.5 MG/DL (ref 0–1.2)
BUN SERPL-MCNC: 17 MG/DL (ref 6–23)
CALCIUM SERPL-MCNC: 9.5 MG/DL (ref 8.6–10.3)
CHLORIDE SERPL-SCNC: 93 MMOL/L (ref 98–107)
CO2 SERPL-SCNC: 26 MMOL/L (ref 21–32)
CREAT SERPL-MCNC: 1.2 MG/DL (ref 0.5–1.3)
EGFRCR SERPLBLD CKD-EPI 2021: 67 ML/MIN/1.73M*2
EOSINOPHIL # BLD AUTO: 0.08 X10*3/UL (ref 0–0.7)
EOSINOPHIL NFR BLD AUTO: 1.6 %
ERYTHROCYTE [DISTWIDTH] IN BLOOD BY AUTOMATED COUNT: 11.8 % (ref 11.5–14.5)
GLUCOSE SERPL-MCNC: 102 MG/DL (ref 74–99)
HCT VFR BLD AUTO: 36.7 % (ref 41–52)
HGB BLD-MCNC: 13.4 G/DL (ref 13.5–17.5)
IMM GRANULOCYTES # BLD AUTO: 0.01 X10*3/UL (ref 0–0.7)
IMM GRANULOCYTES NFR BLD AUTO: 0.2 % (ref 0–0.9)
LAB AP ASR DISCLAIMER: NORMAL
LAB AP BLOCK FOR ADDITIONAL STUDIES: NORMAL
LABORATORY COMMENT REPORT: NORMAL
LDH SERPL L TO P-CCNC: 104 U/L (ref 84–246)
LYMPHOCYTES # BLD AUTO: 1.35 X10*3/UL (ref 1.2–4.8)
LYMPHOCYTES NFR BLD AUTO: 26.3 %
MCH RBC QN AUTO: 32.8 PG (ref 26–34)
MCHC RBC AUTO-ENTMCNC: 36.5 G/DL (ref 32–36)
MCV RBC AUTO: 90 FL (ref 80–100)
MONOCYTES # BLD AUTO: 0.56 X10*3/UL (ref 0.1–1)
MONOCYTES NFR BLD AUTO: 10.9 %
NEUTROPHILS # BLD AUTO: 3.1 X10*3/UL (ref 1.2–7.7)
NEUTROPHILS NFR BLD AUTO: 60.2 %
NRBC BLD-RTO: 0 /100 WBCS (ref 0–0)
PATH REPORT.FINAL DX SPEC: NORMAL
PATH REPORT.GROSS SPEC: NORMAL
PATH REPORT.RELEVANT HX SPEC: NORMAL
PATH REPORT.TOTAL CANCER: NORMAL
PATHOLOGY SYNOPTIC REPORT: NORMAL
PLATELET # BLD AUTO: 235 X10*3/UL (ref 150–450)
POTASSIUM SERPL-SCNC: 3.5 MMOL/L (ref 3.5–5.3)
PROT SERPL-MCNC: 6.6 G/DL (ref 6.4–8.2)
PROT SERPL-MCNC: 6.9 G/DL (ref 6.4–8.2)
RBC # BLD AUTO: 4.08 X10*6/UL (ref 4.5–5.9)
SODIUM SERPL-SCNC: 128 MMOL/L (ref 136–145)
WBC # BLD AUTO: 5.1 X10*3/UL (ref 4.4–11.3)

## 2025-05-16 PROCEDURE — 3008F BODY MASS INDEX DOCD: CPT | Performed by: STUDENT IN AN ORGANIZED HEALTH CARE EDUCATION/TRAINING PROGRAM

## 2025-05-16 PROCEDURE — 84155 ASSAY OF PROTEIN SERUM: CPT | Performed by: STUDENT IN AN ORGANIZED HEALTH CARE EDUCATION/TRAINING PROGRAM

## 2025-05-16 PROCEDURE — 3078F DIAST BP <80 MM HG: CPT | Performed by: STUDENT IN AN ORGANIZED HEALTH CARE EDUCATION/TRAINING PROGRAM

## 2025-05-16 PROCEDURE — 99215 OFFICE O/P EST HI 40 MIN: CPT | Performed by: STUDENT IN AN ORGANIZED HEALTH CARE EDUCATION/TRAINING PROGRAM

## 2025-05-16 PROCEDURE — 1159F MED LIST DOCD IN RCRD: CPT | Performed by: STUDENT IN AN ORGANIZED HEALTH CARE EDUCATION/TRAINING PROGRAM

## 2025-05-16 PROCEDURE — 99205 OFFICE O/P NEW HI 60 MIN: CPT | Performed by: STUDENT IN AN ORGANIZED HEALTH CARE EDUCATION/TRAINING PROGRAM

## 2025-05-16 PROCEDURE — 82784 ASSAY IGA/IGD/IGG/IGM EACH: CPT | Performed by: STUDENT IN AN ORGANIZED HEALTH CARE EDUCATION/TRAINING PROGRAM

## 2025-05-16 PROCEDURE — G2211 COMPLEX E/M VISIT ADD ON: HCPCS | Performed by: STUDENT IN AN ORGANIZED HEALTH CARE EDUCATION/TRAINING PROGRAM

## 2025-05-16 PROCEDURE — 83521 IG LIGHT CHAINS FREE EACH: CPT | Performed by: STUDENT IN AN ORGANIZED HEALTH CARE EDUCATION/TRAINING PROGRAM

## 2025-05-16 PROCEDURE — 85025 COMPLETE CBC W/AUTO DIFF WBC: CPT | Performed by: STUDENT IN AN ORGANIZED HEALTH CARE EDUCATION/TRAINING PROGRAM

## 2025-05-16 PROCEDURE — 83615 LACTATE (LD) (LDH) ENZYME: CPT | Performed by: STUDENT IN AN ORGANIZED HEALTH CARE EDUCATION/TRAINING PROGRAM

## 2025-05-16 PROCEDURE — 3074F SYST BP LT 130 MM HG: CPT | Performed by: STUDENT IN AN ORGANIZED HEALTH CARE EDUCATION/TRAINING PROGRAM

## 2025-05-16 PROCEDURE — 1126F AMNT PAIN NOTED NONE PRSNT: CPT | Performed by: STUDENT IN AN ORGANIZED HEALTH CARE EDUCATION/TRAINING PROGRAM

## 2025-05-16 PROCEDURE — 80053 COMPREHEN METABOLIC PANEL: CPT | Performed by: STUDENT IN AN ORGANIZED HEALTH CARE EDUCATION/TRAINING PROGRAM

## 2025-05-16 ASSESSMENT — PAIN SCALES - GENERAL: PAINLEVEL_OUTOF10: 0-NO PAIN

## 2025-05-16 NOTE — TUMOR BOARD NOTE
General Patient Information  Name:  Altaf Gaming  Evaluation #:  1  Conference Date:  5/19/2025  YOB: 1960  MRN:  [id number]  Program Physician(s):  Mu Gayle  Referring Physician(s):  Ed Murillo      Summary   Stage:  Lounan ([id number]) ; Melanoma 5 year survival: 99%    Assessment:  Melanoma of the right upper back, Breslow's thickness 0.5 mm without ulceration.    Recommendation:  WLE with 1 cm margins.    Review Multidisciplinary Cutaneous Oncology Conference recommendation with patient.  Continue routine follow up and total body skin exams with Dermatology.    Follow Up:  Dermatology, Derm surgery.      History and Physical Exam  Dermatologic History:   64 y.o. male with a biopsy of the right upper back on 4/28/2025 showing a a non-ulcerated melanoma, superficial spreading type, Breslow: 0.5 mm.        Pathology  Surgical Pathology Exam: [id number]  Order: [id number]   Collected 4/28/2025 15:05       Status: Final result       Dx: Melanocytic nevus, unspecified location    Test Result Released: Yes (not seen)    1 Result Note       Component  Resulting Agency   FINAL DIAGNOSIS   A. Skin, right upper back, excision:  --Melanoma, invasive, estimated Breslow's depth 0.5 mm, present at peripheral margin, see comment and case summary report     Comment: Routine and immunostained sections show mildly sun-damaged skin with a broad, asymmetric compound melanocytic proliferation transected at a peripheral margin.  The junctional component consists of predominantly nested melanocytes with moderate enlarged nuclei, increased pale eosinophilic cytoplasm, variable nest size, interrete bridging, interrete and pagetoid spread, and adnexal extension, extending to a peripheral margin.  Nests of similar melanocytes are present in the superficial dermis without maturation and with an isolated mitosis, measuring 0.5 mm in greatest depth.  There is focal parakeratosis and superficial dermal fibrosis with  scattered melanophages and mild chronic inflammation.  The melanocytes show diffuse SOX10 and PRAME positivity (> 75%).  P16 shows a checkerboard staining pattern.  No staining is seen with [id number].     This material was sent to Dr. Carrington Riddle at the [facility] for his expert opinion ([id number]).  Dr. Riddle agrees with the above diagnosis of invasive melanoma.  His full report has been scanned into the patient's electronic medical record.  Marginal involvement suggests partial sampling, therefore, the histologic features outlined below may not be entirely representative.  Reexcision with clinically appropriate margins is recommended.     Dr. Murillo notified of the preliminary findings via secure chat on 5/8/25.   Electronically signed by Maame Drake MD on 5/16/2025 at [facility]   By the signature on this report, the individual or group listed as making the Final Interpretation/Diagnosis certifies that they have reviewed this case.    Case Summary Report   MELANOMA OF THE SKIN: Biopsy   8th Edition - Protocol posted: 3/23/2022MELANOMA OF THE SKIN: BIOPSY - All Specimens  SPECIMEN   Procedure  Biopsy, punch   Specimen Laterality  Right   TUMOR   Tumor Site  Skin of trunk: Upper back        Histologic Type  Superficial spreading melanoma (low-cumulative sun damage (CSD) melanoma)   Maximum Tumor (Breslow) Thickness (Millimeters)  0.5 mm   Ulceration  Not identified   Anatomic (Deondre) Level  III (melanoma fills and expands papillary dermis)   Mitotic Rate  1 mitoses per mm2   Microsatellite(s)  Not identified   Lymphovascular Invasion  Not identified   Neurotropism  Not identified   Tumor-Infiltrating Lymphocytes  Present, nonbrisk   Tumor Regression  Not identified   MARGINS     Margin Status for Invasive Melanoma  All margins negative for invasive melanoma   Distance from Invasive Melanoma to Closest Peripheral Margin  0.2 mm   Distance from Invasive Melanoma to Deep Margin  3 mm    Margin Status for Melanoma in situ  Melanoma in situ present at margin   Margin(s) Involved by Melanoma in Situ  Peripheral   PATHOLOGIC STAGE CLASSIFICATION (pTNM, AJCC 8th Edition)     pT Category  pT1a   .      Block for Additional Biomarkers/Molecular Studies  [facility]   Normal Block: n/a  Tumor Block: A1   Clinical History  TRIP   Melanocytic nevus, unspecified location D22.9

## 2025-05-16 NOTE — PROGRESS NOTES
Patient ID: Altaf Gaming is a 64 y.o. male.  Referring Physician: Ed Murillo DO  [address]  [address]  [place] [address]  Primary Care Provider: Ed Murillo DO  Referral Reason: Ig M monoclonal protein    HPI:  Patient is 64 years old male with history of hypertension, peripheral neuropathy and new diagnosis of melanoma who came in for IgM monoclonal protein.  Patient reports that he had experience skin hypersensitivity 6 or 7 months ago, also along with pins and needle sensation in feet.  He saw Dr. Juarez neurology and did workup, he was found to have IgM kappa monoclonal band and a faint band in IgG kappa.  He also have completed EMG testing which consistent with mild to moderate left median mononeuropathy.  Patient reports that some of the neuropathy symptoms dissipated by itself.  Patient denies any weight loss, fatigue, night sweat, blurred vision, headache.  Reviewing the patient's labs, CBC and CMP are normal in December 2024, A1c 5.1, TSH normal.    Patient also reports that he has many skin lesions on his back and his primary care doctor treated with cryotherapy at the clinic and then recently one lesion got biopsied. The pathology came back consistent with melanoma.     Past Medical History: Medical History[1]  Social History:   Social History     Socioeconomic History    Marital status:      Spouse name: Not on file    Number of children: Not on file    Years of education: Not on file    Highest education level: Not on file   Occupational History    Not on file   Tobacco Use    Smoking status: Never     Passive exposure: Never    Smokeless tobacco: Never   Vaping Use    Vaping status: Never Used   Substance and Sexual Activity    Alcohol use: Not Currently     Comment: rare    Drug use: Never    Sexual activity: Defer   Other Topics Concern    Not on file   Social History Narrative    Not on file     Social Drivers of Health     Financial Resource Strain: Not on file  "  Food Insecurity: Not on file   Transportation Needs: Not on file   Physical Activity: Not on file   Stress: Not on file   Social Connections: Not on file   Intimate Partner Violence: Not on file   Housing Stability: Not on file     Surgical History: Surgical History[2]  Family History: Family History[3]   reports that he has never smoked. He has never been exposed to tobacco smoke. He has never used smokeless tobacco.  Oncology Family history: Cancer-related family history includes Cancer in his father; Esophageal cancer in his father.    Review Of Systems:  General: no fatigue, weight change, appetite change  Ears/Nose/Mouth/Throat: no mouth sore, no hearing loss, no nasal congestion, no sore throat  Cardiovascular: no chest pain, no shortness of breath, no palpitation  Pulmonary: no cough, no wheezing, no hemoptysis  GI: no nausea, no vomiting, no diarrhea or constipation, no bleeding per rectum  Neurological: no dizziness, no seizure, no weakness, no sensation change  Musculoskeletal: no joint swelling, no bone pain, no back pain  Skin: no skin rash, no bruising, no skin lesion      Physical Exam:  /69 (BP Location: Right arm, Patient Position: Sitting, BP Cuff Size: Adult)   Pulse (!) 47   Temp 36.4 °C (97.5 °F) (Temporal)   Resp 17   Ht (S) 1.763 m (5' 9.41\")   Wt 87.8 kg (193 lb 7.3 oz)   SpO2 97%   BMI 28.23 kg/m²   BSA: 2.07 meters squared  General: awake/alert/oriented x3, no distress  Head: atraumatic. Symmetric facial expressions  Eyes: PERRL, EOMI, clear sclera.  Ears/Nose/Mouth/Throat:  Oral mucous membranes moist. No oral ulcers  Neck: No palpable cervical chain lymph nodes  Respiratory: unlabored breathing on room air, good chest expansion, clear breath sounds on both sides, no ronchi  Cardio: Regular rate and rhythm, normal S1 and S2, radial pulses symmetric  GI: Nondistended, soft, non-tender abdomen  Musculoskeletal: Normal muscle bulk and tone, ROM intact, no joint " swelling.  Extremities: No pedal edema, no arm or leg wounds  Neuro: Alert, cognition intact, speech normal. No motor deficits noted. Sensation intact to touch and hot/cold.   Psychological: Appropriate mood and behavior.  Skin: Warm and dry, no lesions, no rashes    Results:  Diagnostic Results   Lab Results   Component Value Date    WBC 5.7 12/10/2024    HGB 13.8 12/10/2024    HCT 38.2 (L) 12/10/2024    MCV 93 12/10/2024     12/10/2024     Lab Results   Component Value Date    CALCIUM 9.5 12/10/2024     (L) 12/10/2024    K 4.0 12/10/2024    CO2 29 12/10/2024    CL 99 12/10/2024    BUN 14 12/10/2024    CREATININE 1.11 12/10/2024    ALT 29 12/10/2024    AST 25 12/10/2024     Current Medications[4]     Radiology:  No results found.     Pathology:    Assessment/Plan:    IgM kappa gammopathy  - Peripheral neuropathy with unknown etiology, some skin lesions with recently biopsy-proven melanoma  - Vitamin B12 normal, A1c normal, TSH normal  - IPEP completed in April 2025 showed IgM kappa monoclonal band and faint IgG kappa  - Will collect Ig level, repeat SPEP and free light chain, CBC CMP LDH flow cytometry  - No other symptoms and if Ig M less than 3000, will continue to monitor      New diagnosis of melanoma cutaneous  - Patient recommended to follow-up with dermatologist for wide excision    Performance Status: Asymptomatic    I spent more than 60 minutes for the patient today, including face-to-face conversation, pre-visit preparation, post-visit orders, and others.   Le Uriostegui MD                                [1]   Past Medical History:  Diagnosis Date    Encounter for screening for malignant neoplasm of prostate     Screening PSA (prostate specific antigen)    Hyperlipidemia     Hypertension     Other specified abnormal findings of blood chemistry 07/13/2016    Elevated serum creatinine    Personal history of other diseases of the circulatory system     History of hypertension    Personal history of  other diseases of the circulatory system     Personal history of cardiac murmur    Personal history of other diseases of the digestive system     History of gastroesophageal reflux (GERD)    Personal history of other endocrine, nutritional and metabolic disease     History of hyperlipidemia   [2]   Past Surgical History:  Procedure Laterality Date    CYST REMOVAL  2023    Dr. Barnard - cyst located on neck    ESOPHAGOGASTRODUODENOSCOPY  02/11/2014    Diagnostic Esophagogastroduodenoscopy   [3]   Family History  Problem Relation Name Age of Onset    Heart disease Mother 82     Other (open heart surgery) Mother 82     Hypertension Father      Cancer Father      Esophageal cancer Father      Stroke Father     [4]   Current Outpatient Medications:     amLODIPine (Norvasc) 10 mg tablet, Take 1 tablet (10 mg) by mouth once daily., Disp: 90 tablet, Rfl: 3    atorvastatin (Lipitor) 20 mg tablet, Take 1 tablet (20 mg) by mouth once daily. 90 tablet, Disp: 90 tablet, Rfl: 3    hydroCHLOROthiazide (HYDRODiuril) 25 mg tablet, TAKE 1 TABLET DAILY, Disp: 90 tablet, Rfl: 3    losartan (Cozaar) 100 mg tablet, TAKE 1 TABLET DAILY, Disp: 90 tablet, Rfl: 3    metoprolol tartrate (Lopressor) 50 mg tablet, TAKE 1 TABLET TWICE A DAY, Disp: 180 tablet, Rfl: 3    naproxen (Naprosyn) 500 mg tablet, Take 1 tablet (500 mg) by mouth 2 times a day as needed for moderate pain (4 - 6) (Pain)., Disp: 20 tablet, Rfl: 0

## 2025-05-16 NOTE — PROGRESS NOTES
"Patient here for new patient visit with Dr. Uriostegui for referral due to family history of protein C deficiency.  Patient here with his wife.    Medications and Allergies reviewed and reconciled this visit.    No concerns or complaints noted at this time.     Pt reports appetite is good.  Pt denies pain.  Pt denies any N/V/D.  Pt. denies neuropathy at this time. Reports h/o \"pins and needles\" and was seen by neurology.     Pt reports that his PCP called him today with a new Dx of Melanoma on back.    Labs today.  Follow up per  MD request in 2 weeks.    Pt. reports availability and use of mychart, Reviewed this is a good place to communicate with the team as well as review labs and upcoming orders.  Education Documentation  Comprehensive Metabolic Panel (CMP), taught by Melanie Burt RN at 5/16/2025  3:39 PM.  Learner: Patient  Readiness: Acceptance  Method: Explanation, Teach-back  Response: Verbalizes Understanding    Complete Blood Count with Differential (CBC w/ Diff), taught by Melanie Burt RN at 5/16/2025  3:39 PM.  Learner: Patient  Readiness: Acceptance  Method: Explanation, Teach-back  Response: Verbalizes Understanding    Oriented to Facility, taught by Melanie Burt RN at 5/16/2025  3:00 PM.  Learner: Patient  Readiness: Acceptance  Method: Explanation, Teach-back  Response: Verbalizes Understanding    Education Comments  No comments found.           No barriers to education noted, patient agrees to current plan and verbalized understanding using teach back method.   "

## 2025-05-17 LAB
IGA SERPL-MCNC: 102 MG/DL (ref 70–400)
IGG SERPL-MCNC: 656 MG/DL (ref 700–1600)
IGM SERPL-MCNC: 837 MG/DL (ref 40–230)

## 2025-05-18 LAB
KAPPA LC SERPL-MCNC: 5.12 MG/DL (ref 0.33–1.94)
KAPPA LC/LAMBDA SER: 4.88 {RATIO} (ref 0.26–1.65)
LAMBDA LC SERPL-MCNC: 1.05 MG/DL (ref 0.57–2.63)

## 2025-05-19 ENCOUNTER — TUMOR BOARD CONFERENCE (OUTPATIENT)
Dept: HEMATOLOGY/ONCOLOGY | Facility: HOSPITAL | Age: 65
End: 2025-05-19
Payer: MEDICARE

## 2025-05-19 LAB
CELL COUNT (BLOOD): 5.1 X10*3/UL
CELL POPULATIONS: NORMAL
DIAGNOSIS: NORMAL
FLOW DIFFERENTIAL: NORMAL
FLOW TEST ORDERED: NORMAL
LAB TEST METHOD: NORMAL
NUMBER OF CELLS COLLECTED: NORMAL PER TUBE
PATH REPORT.TOTAL CANCER: NORMAL
SIGNATURE COMMENT: NORMAL
SPECIMEN VIABILITY: NORMAL

## 2025-05-21 ENCOUNTER — PROCEDURE VISIT (OUTPATIENT)
Dept: DERMATOLOGY | Facility: CLINIC | Age: 65
End: 2025-05-21
Payer: MEDICARE

## 2025-05-21 DIAGNOSIS — C43.59 MALIGNANT MELANOMA OF UPPER BACK (MULTI): ICD-10-CM

## 2025-05-21 DIAGNOSIS — I10 BENIGN ESSENTIAL HYPERTENSION: ICD-10-CM

## 2025-05-21 PROCEDURE — 99204 OFFICE O/P NEW MOD 45 MIN: CPT | Performed by: DERMATOLOGY

## 2025-05-21 PROCEDURE — 12032 INTMD RPR S/A/T/EXT 2.6-7.5: CPT | Performed by: DERMATOLOGY

## 2025-05-21 PROCEDURE — 11603 EXC TR-EXT MAL+MARG 2.1-3 CM: CPT | Performed by: DERMATOLOGY

## 2025-05-21 RX ORDER — AMLODIPINE BESYLATE 10 MG/1
10 TABLET ORAL DAILY
Qty: 30 TABLET | Refills: 0 | Status: SHIPPED | OUTPATIENT
Start: 2025-05-21 | End: 2025-06-20

## 2025-05-21 RX ORDER — AMLODIPINE BESYLATE 10 MG/1
10 TABLET ORAL DAILY
Qty: 90 TABLET | Refills: 2 | Status: SHIPPED | OUTPATIENT
Start: 2025-05-21 | End: 2025-08-19

## 2025-05-21 NOTE — PROGRESS NOTES
"Excision Operative Note    Date of Surgery:  5/21/2025  Surgeon:  Mamie Moses MD  Office Location: DO [address] [facility]  [address]  [address]  [facility] [address]  Dept: [phone]  Dept Fax: [phone]  Referring Provider: Ed Murillo, DO  [address]  [address]  [place] [address]    [facility]   Altaf Gaming is a 64 y.o. male who presents for the following: Excision (Melanoma Left Upper Back) for melanoma. Pathology report from 4/28/25 lists the site as \"Right Upper Back.\" Dr. Murillo was contacted and confirmed the correction location as the \"Left Upper Back.\"     According to the patient, the lesion has been present for approximately 6 months at the time of diagnosis.  The lesion is not causing symptoms.  The lesion has not been treated previously.    The patient does not have a pacemaker / defibrillator.  The patient does not have a heart valve / joint replacement.    The patient is not on blood thinners.   The patient does not have a history of hepatitis B or C.  The patient does not have a history of HIV.  The patient does not have a history of immunosuppression (e.g. organ transplantation, malignancy, medications)    Is it okay to leave a phone message with results? Y  Who else may we leave results with: (name, relationship) Gayle (wife): [phone]    The following portions of the chart were reviewed this encounter and updated as appropriate:         Assessment/Plan   Pre-procedure:   Obtained informed consent: written from patient  The surgical site was identified and confirmed with the patient.     Intra-operative:   Audible time out called at : 1:54 PM 05/21/25  by: Mamie Moses MD   Verified patient name, birthdate, site, specimen bottle label & requisition.    The planned procedure(s) was again reviewed with the patient. The risks of bleeding, infection, nerve damage and scarring were reviewed. The patient identity, surgical site, and " planned procedure(s) were verified.     Biopsy Accession Number: [id number]  MALIGNANT MELANOMA OF UPPER BACK (MULTI)  Left Upper Back  Skin excision    Lesion length (cm):  0.7  Lesion width (cm):  0.5  Margin per side (cm):  1  Total excision diameter (cm):  2.7  Informed consent: discussed and consent obtained    Timeout: patient name, date of birth, surgical site, and procedure verified    Procedure prep:  Patient was prepped and draped  Anesthesia: the lesion was anesthetized in a standard fashion    Local anesthetic: 1.5% lido w/epi.  Instrument used: #15 blade    Hemostasis achieved with: electrodesiccation    Outcome: patient tolerated procedure well with no complications    Post-procedure details: sterile dressing applied and wound care instructions given    Dressing type: pressure dressing    Additional details:  Melanoma Leena:   Curative Intent: Yes  Original Breslow Thickness: 0.5 mm  Clinical margin width: 1 cm  Depth of excision: Full thickness     Skin repair  Complexity:  Intermediate  Final length (cm):  5.5  Informed consent: discussed and consent obtained    Timeout: patient name, date of birth, surgical site, and procedure verified    Procedure prep:  Patient prepped in sterile fashion  Anesthesia: the lesion was anesthetized in a standard fashion    Local anesthetic: 1.5% lido with epi.  Reason for type of repair: reduce tension to allow closure    Undermining: edges undermined    Subcutaneous layers (deep stitches):   Suture size:  3-0  Suture type: Vicryl (polyglactin 910)    Stitches:  Buried vertical mattress  Fine/surface layer approximation (top stitches):   Suture size:  3-0  Suture type: Prolene (polypropylene)    Stitches: simple running    Suture removal (days):  12  Hemostasis achieved with: electrodesiccation  Outcome: patient tolerated procedure well with no complications    Post-procedure details: sterile dressing applied and wound care instructions given    Dressing type: pressure  dressing    Specimen 1 - Dermatopathology- DERM LAB  Differential Diagnosis: Melanoma  Check Margins Yes/No?:  Yes  Comments:    Dermpath Lab: Routine Histopathology (formalin-fixed tissue)  Intermediate Linear Repair:  Given the location and size of the defect, it was determined that an intermediate layered linear closure was required to restore normal anatomy and function. The repair is an intermediate closure as two layers of sutures were required. The defect was undermined extensively at the level of the subcutaneous plane. Standing cutaneous cones were removed using Burow's triangles. The wound edges were brought into close approximation with buried vertical mattress sutures. The remainder of the wound was then closed with epidermal top sutures.    The final repair measured 5.5 cm      Wound care was discussed, and the patient was given written post-operative wound care instructions.      The patient will follow up with Mamie Moses MD as needed for any post operative problems or concerns, and will follow up with their primary dermatologist as scheduled.      I, Mamie Moses MD, personally performed the services described in the documentation and confirm it is both accurate and complete. .

## 2025-05-21 NOTE — PROGRESS NOTES
Melanoma Excision Consult Note    Date of Surgery: 5/21/2025  Surgeon:  Mamie Moess MD  Office Location:  [address] [facility]  [address]  [address]  [facility] [address]  Dept: [phone]  Dept Fax: [phone]   Referring Provider: Ed Murillo, DO  [address]  [address]  [place] [address]       Subjective   Altaf Gaming is a 64 y.o. male who presents for the following: Excision (Melanoma Left Upper Back) for melanoma.    According to the patient, the lesion has been present for approximately 6 months at the time of diagnosis.  The lesion is not causing symptoms.  The lesion has not been treated previously.    The patient does not have a pacemaker / defibrillator.  The patient does not have a heart valve / joint replacement.  The patient is not on blood thinners.    The following portions of the chart were reviewed this encounter and updated as appropriate:       Review of Systems:  No other skin or systemic complaints other than what is documented elsewhere in the note.    Medical History:  Clinically relevant history including significant past medical history, review of systems, medications and allergies was reviewed and is documented in Epic.    Objective   Well appearing patient in no apparent distress; mood and affect are within normal limits.  Vital signs: See record.  Noted on the   Left Upper Back  Is a 0.7 x 0.5 cm scar  The patient confirmed the identified site.    Discussion:  The nature of the diagnosis was explained. The lesion is an early melanoma but is likely to have been present for >1 year and is likely to progress without treatment. The multidisciplinary cutaneous oncology tumor board report was reviewed with the patient and surgery is recommended. The patient was informed that based on the depth and lack of ulceration, a sentinel lymph node biopsy is not indicated. However, the melanoma may be upstaged after excision following  histopathologic examination, which may require additional treatment. Warning signs of melanoma were discussed. We recommended that the patient have regular total body skin exams given increased risk of skin cancers. The patient was instructed to use sun protective behaviors including use of broad spectrum sunscreens and sun protective clothing to reduce the risk of skin cancers.     Surgery was recommended and discussed with the patient. The risks, benefits and potential adverse effects were reviewed and the patient voiced understanding. Discussion included but was not limited to the risks of bleeding and infection, likely scar outcome, possible need for revision surgery, cure rate, wound care requirements, activity restrictions and time to heal. Reconstruction options, risks and benefits were reviewed including second intention healing and linear repair (4-1 ratio was explained). It was explained that the scar would be longer than the original lesion.    Medical Decision Making:    Column 1 - chronic illness with progression  Column 2 - category 3: discussion of management with external physicians (multidisciplinary tumor board)  Column 3 - decision regarding minor surgery with identified risk factors (bleeding, infection, scarring)    Mamie Moses MD

## 2025-05-22 DIAGNOSIS — R23.9 POEMS SYNDROME: ICD-10-CM

## 2025-05-22 DIAGNOSIS — G62.9 POEMS SYNDROME: ICD-10-CM

## 2025-05-22 DIAGNOSIS — D47.2 GAMMOPATHY, MONOCLONAL: Primary | ICD-10-CM

## 2025-05-22 DIAGNOSIS — D47.2 POEMS SYNDROME: ICD-10-CM

## 2025-05-22 DIAGNOSIS — E34.9 POEMS SYNDROME: ICD-10-CM

## 2025-05-22 LAB
ALBUMIN: 4.1 G/DL (ref 3.4–5)
ALPHA 1 GLOBULIN: 0.3 G/DL (ref 0.2–0.6)
ALPHA 2 GLOBULIN: 0.5 G/DL (ref 0.4–1.1)
BETA GLOBULIN: 0.6 G/DL (ref 0.5–1.2)
GAMMA GLOBULIN: 1.1 G/DL (ref 0.5–1.4)
IMMUNOFIXATION COMMENT: ABNORMAL
M-PROTEIN 1: 0.6 G/DL (ref ?–0)
PATH REVIEW - SERUM IMMUNOFIXATION: ABNORMAL
PATH REVIEW-SERUM PROTEIN ELECTROPHORESIS: ABNORMAL
PROTEIN ELECTROPHORESIS COMMENT: ABNORMAL

## 2025-05-22 NOTE — PROGRESS NOTES
Spoke with patient, labs showed monoclonal Ig M protein, and Free kappa light chain elevated. Differential Dx could be LPL, plasma cell dyscrasia, POEMS, amyloid. Next step is to do bone marrow biopsy and labs next visit.

## 2025-05-23 ENCOUNTER — TELEPHONE (OUTPATIENT)
Dept: HEMATOLOGY/ONCOLOGY | Facility: CLINIC | Age: 65
End: 2025-05-23
Payer: MEDICARE

## 2025-05-23 ENCOUNTER — TELEPHONE (OUTPATIENT)
Dept: DERMATOLOGY | Facility: CLINIC | Age: 65
End: 2025-05-23
Payer: MEDICARE

## 2025-05-23 LAB
LABORATORY COMMENT REPORT: NORMAL
PATH REPORT.FINAL DX SPEC: NORMAL
PATH REPORT.GROSS SPEC: NORMAL
PATH REPORT.RELEVANT HX SPEC: NORMAL
PATH REPORT.TOTAL CANCER: NORMAL

## 2025-05-23 NOTE — TELEPHONE ENCOUNTER
Pt requesting reason for CT CHEST ABDOMEN PELVIS W IV CONTRAST?  Dr. Uriostegui did not mention on phone call

## 2025-05-23 NOTE — TELEPHONE ENCOUNTER
Surgical site: Left Upper Back  Date of procedure 5/21/2025    Patient informed about pathology result which showed clear margins. No further surgery needed but the patient was advised to follow up with general dermatology. The patient had no further concerns and was advised to call with any questions.

## 2025-05-29 NOTE — PROGRESS NOTES
Apheresis [facility] Donor Center Post Instructions  You may feel tired after your procedure today.   Please call your doctor if you have:  bleeding that doesn t stop, fever, pain where a needle or tube (catheter) was placed, seizures, trouble breathing, red urine, nausea or vomiting, other health concerns.     If your symptoms are severe, call 911.  If your veins were used, keep the bandages on for 2-4 hours.  Avoid heavy lifting with your arms.  If bleeding occurs from these sites, apply firm pressure for 5-10 minutes.  Call your physician if bleeding continues.    If you have a Central Venous Catheter:  Notify your doctor if you have had a fever, chills, shaking  or redness, warmth, swelling, drainage at the exit-site.  This could be a sign of infection.    If we used your fistula or graft, watch for signs of bleeding.  Please remove the bandages after 4 hours.  The Apheresis/Blood Donor Center is open Monday-Friday 7:30 a.m. to 5 p.m.  The phone number is [phone].  A Transfusion Medicine physician can be reached after 5:00 p.m. weekdays and on weekends /Holidays by calling [phone], and asking for the physician on call.      Photopheresis:  Avoid sunlight , and wear UVA-protective, full coverage sunglasses and sunscreen SPF 15 or higher  for 24 hours after your treatment.  The drug used in your treatment makes patients more sensitive to sunlight for about 24 hours after the treatment.   Patient ID: Altaf Gaming is a 64 y.o. male.  Referring Physician: No referring provider defined for this encounter.  Primary Care Provider: Ed Murillo DO  Referral Reason: Ig M monoclonal protein    HPI:  Patient is 64 years old male with history of hypertension, peripheral neuropathy and new diagnosis of melanoma who came in for IgM monoclonal protein.  Patient reports that he had experience skin hypersensitivity 6 or 7 months ago, also along with pins and needle sensation in feet.  He saw Dr. Juarez neurology and did workup, he was found to have IgM kappa monoclonal band and a faint band in IgG kappa.  He also have completed EMG testing which consistent with mild to moderate left median mononeuropathy.  Patient reports that some of the neuropathy symptoms dissipated by itself.  Patient denies any weight loss, fatigue, night sweat, blurred vision, headache.  Reviewing the patient's labs, CBC and CMP are normal in December 2024, A1c 5.1, TSH normal.    Patient also reports that he has many skin lesions on his back and his primary care doctor treated with cryotherapy at the clinic and then recently one lesion got biopsied. The pathology came back consistent with melanoma.     Interim history  Patient is anxious about work up. He continues to have pins and needle sensation throughout body intermittently. Denies weight loss, fatigue, night sweats and fever.     Past Medical History: Medical History[1]  Social History:   Social History     Socioeconomic History    Marital status:      Spouse name: Not on file    Number of children: Not on file    Years of education: Not on file    Highest education level: Not on file   Occupational History    Not on file   Tobacco Use    Smoking status: Never     Passive exposure: Never    Smokeless tobacco: Never   Vaping Use    Vaping status: Never Used   Substance and Sexual Activity    Alcohol use: Not Currently     Comment: rare    Drug use: Never    Sexual  activity: Defer   Other Topics Concern    Not on file   Social History Narrative    Not on file     Social Drivers of Health     Financial Resource Strain: Not on file   Food Insecurity: Not on file   Transportation Needs: Not on file   Physical Activity: Not on file   Stress: Not on file   Social Connections: Not on file   Intimate Partner Violence: Not on file   Housing Stability: Not on file     Surgical History: Surgical History[2]  Family History: Family History[3]   reports that he has never smoked. He has never been exposed to tobacco smoke. He has never used smokeless tobacco.  Oncology Family history: Cancer-related family history includes Cancer in his father; Esophageal cancer in his father.    Review Of Systems:  General: no fatigue, weight change, appetite change  Ears/Nose/Mouth/Throat: no mouth sore, no hearing loss, no nasal congestion, no sore throat  Cardiovascular: no chest pain, no shortness of breath, no palpitation  Pulmonary: no cough, no wheezing, no hemoptysis  GI: no nausea, no vomiting, no diarrhea or constipation, no bleeding per rectum  Neurological: no dizziness, no seizure, no weakness, no sensation change  Musculoskeletal: no joint swelling, no bone pain, no back pain  Skin: no skin rash, no bruising, no skin lesion      Physical Exam:  /77   Pulse (!) 40   Temp 35.9 °C (96.6 °F) (Temporal)   Resp 19   Wt 88.1 kg (194 lb 3.6 oz)   SpO2 100%   BMI 28.34 kg/m²   BSA: 2.08 meters squared  General: awake/alert/oriented x3, no distress  Head: atraumatic. Symmetric facial expressions  Eyes: PERRL, EOMI, clear sclera.  Ears/Nose/Mouth/Throat:  Oral mucous membranes moist. No oral ulcers  Neck: No palpable cervical chain lymph nodes  Respiratory: unlabored breathing on room air, good chest expansion, clear breath sounds on both sides, no ronchi  Cardio: Regular rate and rhythm, normal S1 and S2, radial pulses symmetric  GI: Nondistended, soft, non-tender abdomen  Musculoskeletal:  Normal muscle bulk and tone, ROM intact, no joint swelling.  Extremities: No pedal edema, no arm or leg wounds  Neuro: Alert, cognition intact, speech normal. No motor deficits noted. Sensation intact to touch and hot/cold.   Psychological: Appropriate mood and behavior.  Skin: Warm and dry, no lesions, no rashes    Results:  Diagnostic Results   Lab Results   Component Value Date    WBC 5.1 05/16/2025    HGB 13.4 (L) 05/16/2025    HCT 36.7 (L) 05/16/2025    MCV 90 05/16/2025     05/16/2025     Lab Results   Component Value Date    CALCIUM 9.5 05/16/2025     (L) 05/16/2025    K 3.5 05/16/2025    CO2 26 05/16/2025    CL 93 (L) 05/16/2025    BUN 17 05/16/2025    CREATININE 1.20 05/16/2025    ALT 18 05/16/2025    AST 21 05/16/2025     Current Medications[4]     Radiology:  No results found.     Pathology:    Assessment/Plan:    IgM kappa gammopathy  - Peripheral neuropathy with unknown etiology, some skin lesions with recently biopsy-proven melanoma  - Vitamin B12 normal, A1c normal, TSH normal  - SPEP/IPEP  show monoclonal IgM kappa at 600 mg, kappa light chains at 5.12, lambda light chain 1.05 and free light chain ratio at 4.88, absolute IgM at 83  Flow cytometry no evidence of lymphoproliferative disorder  -CBC is largely in normal limit, LDH normal, total protein normal  -Due to peripheral neuropathy I will order further workup to rule out POEMS, LPL, amyloidosis  - Will check anti-MAG antibody, VEGFR level, MYD 88 mutation  -Scheduled for bone marrow biopsy and CT chest abdomen pelvis  - return to clinic in 6 weeks to review the result      New diagnosis of melanoma cutaneous  - s/p wide excision On May 21, pathology reviewed  Dermatology recommend no further treatment required and continue skin check every 3 to 4 months    Performance Status: Symptomatic; fully ambulatory    I spent more than 45 minutes for the patient today, including face-to-face conversation, pre-visit preparation, post-visit  orders, and others.   Le Uriostegui MD                                [1]   Past Medical History:  Diagnosis Date    Encounter for screening for malignant neoplasm of prostate     Screening PSA (prostate specific antigen)    Hyperlipidemia     Hypertension     Other specified abnormal findings of blood chemistry 07/13/2016    Elevated serum creatinine    Personal history of other diseases of the circulatory system     History of hypertension    Personal history of other diseases of the circulatory system     Personal history of cardiac murmur    Personal history of other diseases of the digestive system     History of gastroesophageal reflux (GERD)    Personal history of other endocrine, nutritional and metabolic disease     History of hyperlipidemia   [2]   Past Surgical History:  Procedure Laterality Date    CYST REMOVAL  2023    Dr. Barnard - cyst located on neck    ESOPHAGOGASTRODUODENOSCOPY  02/11/2014    Diagnostic Esophagogastroduodenoscopy   [3]   Family History  Problem Relation Name Age of Onset    Heart disease Mother 82     Other (open heart surgery) Mother 82     Hypertension Father      Cancer Father      Esophageal cancer Father      Stroke Father     [4]   Current Outpatient Medications:     amLODIPine (Norvasc) 10 mg tablet, Take 1 tablet (10 mg) by mouth once daily., Disp: 30 tablet, Rfl: 0    amLODIPine (Norvasc) 10 mg tablet, Take 1 tablet (10 mg) by mouth once daily., Disp: 90 tablet, Rfl: 2    atorvastatin (Lipitor) 20 mg tablet, Take 1 tablet (20 mg) by mouth once daily. 90 tablet, Disp: 90 tablet, Rfl: 3    hydroCHLOROthiazide (HYDRODiuril) 25 mg tablet, TAKE 1 TABLET DAILY, Disp: 90 tablet, Rfl: 3    losartan (Cozaar) 100 mg tablet, TAKE 1 TABLET DAILY, Disp: 90 tablet, Rfl: 3    metoprolol tartrate (Lopressor) 50 mg tablet, TAKE 1 TABLET TWICE A DAY, Disp: 180 tablet, Rfl: 3     No

## 2025-05-29 NOTE — H&P (VIEW-ONLY)
Patient ID: Altaf Gaming is a 64 y.o. male.  Referring Physician: No referring provider defined for this encounter.  Primary Care Provider: Ed Murillo DO  Referral Reason: Ig M monoclonal protein    HPI:  Patient is 64 years old male with history of hypertension, peripheral neuropathy and new diagnosis of melanoma who came in for IgM monoclonal protein.  Patient reports that he had experience skin hypersensitivity 6 or 7 months ago, also along with pins and needle sensation in feet.  He saw Dr. Juarez neurology and did workup, he was found to have IgM kappa monoclonal band and a faint band in IgG kappa.  He also have completed EMG testing which consistent with mild to moderate left median mononeuropathy.  Patient reports that some of the neuropathy symptoms dissipated by itself.  Patient denies any weight loss, fatigue, night sweat, blurred vision, headache.  Reviewing the patient's labs, CBC and CMP are normal in December 2024, A1c 5.1, TSH normal.    Patient also reports that he has many skin lesions on his back and his primary care doctor treated with cryotherapy at the clinic and then recently one lesion got biopsied. The pathology came back consistent with melanoma.     Interim history  Patient is anxious about work up. He continues to have pins and needle sensation throughout body intermittently. Denies weight loss, fatigue, night sweats and fever.     Past Medical History: Medical History[1]  Social History:   Social History     Socioeconomic History    Marital status:      Spouse name: Not on file    Number of children: Not on file    Years of education: Not on file    Highest education level: Not on file   Occupational History    Not on file   Tobacco Use    Smoking status: Never     Passive exposure: Never    Smokeless tobacco: Never   Vaping Use    Vaping status: Never Used   Substance and Sexual Activity    Alcohol use: Not Currently     Comment: rare    Drug use: Never    Sexual  activity: Defer   Other Topics Concern    Not on file   Social History Narrative    Not on file     Social Drivers of Health     Financial Resource Strain: Not on file   Food Insecurity: Not on file   Transportation Needs: Not on file   Physical Activity: Not on file   Stress: Not on file   Social Connections: Not on file   Intimate Partner Violence: Not on file   Housing Stability: Not on file     Surgical History: Surgical History[2]  Family History: Family History[3]   reports that he has never smoked. He has never been exposed to tobacco smoke. He has never used smokeless tobacco.  Oncology Family history: Cancer-related family history includes Cancer in his father; Esophageal cancer in his father.    Review Of Systems:  General: no fatigue, weight change, appetite change  Ears/Nose/Mouth/Throat: no mouth sore, no hearing loss, no nasal congestion, no sore throat  Cardiovascular: no chest pain, no shortness of breath, no palpitation  Pulmonary: no cough, no wheezing, no hemoptysis  GI: no nausea, no vomiting, no diarrhea or constipation, no bleeding per rectum  Neurological: no dizziness, no seizure, no weakness, no sensation change  Musculoskeletal: no joint swelling, no bone pain, no back pain  Skin: no skin rash, no bruising, no skin lesion      Physical Exam:  /77   Pulse (!) 40   Temp 35.9 °C (96.6 °F) (Temporal)   Resp 19   Wt 88.1 kg (194 lb 3.6 oz)   SpO2 100%   BMI 28.34 kg/m²   BSA: 2.08 meters squared  General: awake/alert/oriented x3, no distress  Head: atraumatic. Symmetric facial expressions  Eyes: PERRL, EOMI, clear sclera.  Ears/Nose/Mouth/Throat:  Oral mucous membranes moist. No oral ulcers  Neck: No palpable cervical chain lymph nodes  Respiratory: unlabored breathing on room air, good chest expansion, clear breath sounds on both sides, no ronchi  Cardio: Regular rate and rhythm, normal S1 and S2, radial pulses symmetric  GI: Nondistended, soft, non-tender abdomen  Musculoskeletal:  Normal muscle bulk and tone, ROM intact, no joint swelling.  Extremities: No pedal edema, no arm or leg wounds  Neuro: Alert, cognition intact, speech normal. No motor deficits noted. Sensation intact to touch and hot/cold.   Psychological: Appropriate mood and behavior.  Skin: Warm and dry, no lesions, no rashes    Results:  Diagnostic Results   Lab Results   Component Value Date    WBC 5.1 05/16/2025    HGB 13.4 (L) 05/16/2025    HCT 36.7 (L) 05/16/2025    MCV 90 05/16/2025     05/16/2025     Lab Results   Component Value Date    CALCIUM 9.5 05/16/2025     (L) 05/16/2025    K 3.5 05/16/2025    CO2 26 05/16/2025    CL 93 (L) 05/16/2025    BUN 17 05/16/2025    CREATININE 1.20 05/16/2025    ALT 18 05/16/2025    AST 21 05/16/2025     Current Medications[4]     Radiology:  No results found.     Pathology:    Assessment/Plan:    IgM kappa gammopathy  - Peripheral neuropathy with unknown etiology, some skin lesions with recently biopsy-proven melanoma  - Vitamin B12 normal, A1c normal, TSH normal  - SPEP/IPEP  show monoclonal IgM kappa at 600 mg, kappa light chains at 5.12, lambda light chain 1.05 and free light chain ratio at 4.88, absolute IgM at 83  Flow cytometry no evidence of lymphoproliferative disorder  -CBC is largely in normal limit, LDH normal, total protein normal  -Due to peripheral neuropathy I will order further workup to rule out POEMS, LPL, amyloidosis  - Will check anti-MAG antibody, VEGFR level, MYD 88 mutation  -Scheduled for bone marrow biopsy and CT chest abdomen pelvis  - return to clinic in 6 weeks to review the result      New diagnosis of melanoma cutaneous  - s/p wide excision On May 21, pathology reviewed  Dermatology recommend no further treatment required and continue skin check every 3 to 4 months    Performance Status: Symptomatic; fully ambulatory    I spent more than 45 minutes for the patient today, including face-to-face conversation, pre-visit preparation, post-visit  orders, and others.   Le Uriostegui MD                                [1]   Past Medical History:  Diagnosis Date    Encounter for screening for malignant neoplasm of prostate     Screening PSA (prostate specific antigen)    Hyperlipidemia     Hypertension     Other specified abnormal findings of blood chemistry 07/13/2016    Elevated serum creatinine    Personal history of other diseases of the circulatory system     History of hypertension    Personal history of other diseases of the circulatory system     Personal history of cardiac murmur    Personal history of other diseases of the digestive system     History of gastroesophageal reflux (GERD)    Personal history of other endocrine, nutritional and metabolic disease     History of hyperlipidemia   [2]   Past Surgical History:  Procedure Laterality Date    CYST REMOVAL  2023    Dr. Barnard - cyst located on neck    ESOPHAGOGASTRODUODENOSCOPY  02/11/2014    Diagnostic Esophagogastroduodenoscopy   [3]   Family History  Problem Relation Name Age of Onset    Heart disease Mother 82     Other (open heart surgery) Mother 82     Hypertension Father      Cancer Father      Esophageal cancer Father      Stroke Father     [4]   Current Outpatient Medications:     amLODIPine (Norvasc) 10 mg tablet, Take 1 tablet (10 mg) by mouth once daily., Disp: 30 tablet, Rfl: 0    amLODIPine (Norvasc) 10 mg tablet, Take 1 tablet (10 mg) by mouth once daily., Disp: 90 tablet, Rfl: 2    atorvastatin (Lipitor) 20 mg tablet, Take 1 tablet (20 mg) by mouth once daily. 90 tablet, Disp: 90 tablet, Rfl: 3    hydroCHLOROthiazide (HYDRODiuril) 25 mg tablet, TAKE 1 TABLET DAILY, Disp: 90 tablet, Rfl: 3    losartan (Cozaar) 100 mg tablet, TAKE 1 TABLET DAILY, Disp: 90 tablet, Rfl: 3    metoprolol tartrate (Lopressor) 50 mg tablet, TAKE 1 TABLET TWICE A DAY, Disp: 180 tablet, Rfl: 3

## 2025-05-30 ENCOUNTER — LAB (OUTPATIENT)
Dept: LAB | Facility: CLINIC | Age: 65
End: 2025-05-30
Payer: MEDICARE

## 2025-05-30 ENCOUNTER — OFFICE VISIT (OUTPATIENT)
Dept: HEMATOLOGY/ONCOLOGY | Facility: CLINIC | Age: 65
End: 2025-05-30
Payer: MEDICARE

## 2025-05-30 VITALS
OXYGEN SATURATION: 100 % | SYSTOLIC BLOOD PRESSURE: 134 MMHG | DIASTOLIC BLOOD PRESSURE: 77 MMHG | RESPIRATION RATE: 19 BRPM | WEIGHT: 194.22 LBS | BODY MASS INDEX: 28.34 KG/M2 | TEMPERATURE: 96.6 F | HEART RATE: 40 BPM

## 2025-05-30 DIAGNOSIS — G62.9 POEMS SYNDROME: ICD-10-CM

## 2025-05-30 DIAGNOSIS — D47.2 GAMMOPATHY, MONOCLONAL: ICD-10-CM

## 2025-05-30 DIAGNOSIS — R23.9 POEMS SYNDROME: ICD-10-CM

## 2025-05-30 DIAGNOSIS — D47.2 GAMMOPATHY, MONOCLONAL: Primary | ICD-10-CM

## 2025-05-30 DIAGNOSIS — D47.2 POEMS SYNDROME: ICD-10-CM

## 2025-05-30 DIAGNOSIS — E34.9 POEMS SYNDROME: ICD-10-CM

## 2025-05-30 LAB
T4 FREE SERPL-MCNC: 1.13 NG/DL (ref 0.78–1.48)
TSH SERPL-ACNC: 4.16 MIU/L (ref 0.44–3.98)

## 2025-05-30 PROCEDURE — G2211 COMPLEX E/M VISIT ADD ON: HCPCS | Performed by: STUDENT IN AN ORGANIZED HEALTH CARE EDUCATION/TRAINING PROGRAM

## 2025-05-30 PROCEDURE — 99215 OFFICE O/P EST HI 40 MIN: CPT | Performed by: STUDENT IN AN ORGANIZED HEALTH CARE EDUCATION/TRAINING PROGRAM

## 2025-05-30 PROCEDURE — 3075F SYST BP GE 130 - 139MM HG: CPT | Performed by: STUDENT IN AN ORGANIZED HEALTH CARE EDUCATION/TRAINING PROGRAM

## 2025-05-30 PROCEDURE — 1159F MED LIST DOCD IN RCRD: CPT | Performed by: STUDENT IN AN ORGANIZED HEALTH CARE EDUCATION/TRAINING PROGRAM

## 2025-05-30 PROCEDURE — 83520 IMMUNOASSAY QUANT NOS NONAB: CPT

## 2025-05-30 PROCEDURE — 1126F AMNT PAIN NOTED NONE PRSNT: CPT | Performed by: STUDENT IN AN ORGANIZED HEALTH CARE EDUCATION/TRAINING PROGRAM

## 2025-05-30 PROCEDURE — 84439 ASSAY OF FREE THYROXINE: CPT

## 2025-05-30 PROCEDURE — 84443 ASSAY THYROID STIM HORMONE: CPT

## 2025-05-30 PROCEDURE — 3078F DIAST BP <80 MM HG: CPT | Performed by: STUDENT IN AN ORGANIZED HEALTH CARE EDUCATION/TRAINING PROGRAM

## 2025-05-30 PROCEDURE — 36415 COLL VENOUS BLD VENIPUNCTURE: CPT

## 2025-05-30 ASSESSMENT — PAIN SCALES - GENERAL: PAINLEVEL_OUTOF10: 0-NO PAIN

## 2025-05-30 NOTE — PROGRESS NOTES
Patient here for follow up visit with Dr. Uriostegui for reviewing labs. Discussed possible diagnoses. MD awaiting further test results.   Patient here with his wife.    Medications and Allergies reviewed and reconciled this visit.    No concerns or complaints noted at this time.     Pt reports appetite is good.  Pt denies pain.  Pt denies any N/V/D.  Pt reports neuropathy    CT scan ordered 6/10/25  BMB 6/24/25  Follow up per  MD request in 6 weeks.    Pt. reports availability and use of mychart, Reviewed this is a good place to communicate with the team as well as review labs and upcoming orders.     No barriers to education noted, patient agrees to current plan and verbalized understanding using teach back method.

## 2025-06-01 ENCOUNTER — TELEPHONE (OUTPATIENT)
Dept: DERMATOLOGY | Facility: HOSPITAL | Age: 65
End: 2025-06-01
Payer: MEDICARE

## 2025-06-01 DIAGNOSIS — T81.49XA SURGICAL SITE INFECTION: Primary | ICD-10-CM

## 2025-06-01 RX ORDER — CEPHALEXIN 500 MG/1
500 CAPSULE ORAL 4 TIMES DAILY
Qty: 40 CAPSULE | Refills: 0 | Status: SHIPPED | OUTPATIENT
Start: 2025-06-01 | End: 2025-06-11

## 2025-06-01 NOTE — TELEPHONE ENCOUNTER
TELEPHONE ENCOUNTER  6/1/2025  Altaf Gaming  [phone]    Reason for Call:  63 yo M who underwent excision of melanoma on the left upper back on 5/21. He reports that over the past few days, he has had an increase in redness, pain, and purulence of the surgical site. Denies any fevers, chills, or malaise. Patient has a post-op wound check visit with Dr. Moses tomorrow, but he is anxious about infection.    Patient advised:  This does sound concerning for a surgical site infection. Sent keflex 500mg 4 times daily x 10 day course to his pharmacy. Instructed him to call immediately if he develops any fevers, chills, malaise, lightheadedness, CP, SOB, or other new symptoms. Also instructed him to follow up tomorrow with Dr. Moses. Patient expressed understanding and agreed with plan.    Isabell Haney MD  PGY-2, Dermatology

## 2025-06-02 ENCOUNTER — APPOINTMENT (OUTPATIENT)
Dept: DERMATOLOGY | Facility: CLINIC | Age: 65
End: 2025-06-02
Payer: MEDICARE

## 2025-06-02 DIAGNOSIS — Z51.89 VISIT FOR WOUND CHECK: Primary | ICD-10-CM

## 2025-06-02 PROCEDURE — 99212 OFFICE O/P EST SF 10 MIN: CPT | Performed by: DERMATOLOGY

## 2025-06-02 NOTE — PROGRESS NOTES
Office Follow Up Note    Visit Summary  Chief Complaint    1. Complaint Wound check.    Altaf Gaming is a 64 y.o. male who presents for 12 days follow up after surgery for a melanoma. The patient has concerns of tenderness, redness, warmth, drainage. Called resident on call yesterday, started to take Keflex 500mg last night. A friend also patted his back directly on the wound over the weekend. Patient noted it was looking fine for the first week.    Location Operation site location: Left Upper Back    On exam,  Mr. Gaming is well-appearing and in no apparent distress. The surgical site erythematous, warm to touch, and with some purulent drainage.     Assessment and Plan:  Bacterial culture obtained, continue keflex as prescribed, sutures removed.  History of skin cancer requiring ongoing monitoring for recurrence and additional lesion development.   The patient was reassured that the wound is healing appropriately.     The patient was advised on the importance of routine skin monitoring including follow up with general dermatology and instructed to call with any further concerns. The patient will return in as needed.     IMaria G RN  am scribing for, and in the presence of MD CARLOS Russell Christina Y Wong, MD, personally performed the services described in the documentation as scribed by Maria G Deshpande RN in my presence, and confirm it is both accurate and complete.

## 2025-06-03 LAB — VEGF SERPL-MCNC: 23 PG/ML (ref 9–86)

## 2025-06-04 ENCOUNTER — HOSPITAL ENCOUNTER (OUTPATIENT)
Dept: RADIOLOGY | Facility: HOSPITAL | Age: 65
Discharge: HOME | End: 2025-06-04
Payer: MEDICARE

## 2025-06-04 VITALS
TEMPERATURE: 96.8 F | RESPIRATION RATE: 12 BRPM | HEART RATE: 42 BPM | DIASTOLIC BLOOD PRESSURE: 56 MMHG | SYSTOLIC BLOOD PRESSURE: 101 MMHG | OXYGEN SATURATION: 98 %

## 2025-06-04 DIAGNOSIS — D47.2 GAMMOPATHY, MONOCLONAL: ICD-10-CM

## 2025-06-04 LAB
BASOPHILS # BLD AUTO: 0.04 X10*3/UL (ref 0–0.1)
BASOPHILS NFR BLD AUTO: 0.8 %
EOSINOPHIL # BLD AUTO: 0.11 X10*3/UL (ref 0–0.7)
EOSINOPHIL NFR BLD AUTO: 2.2 %
ERYTHROCYTE [DISTWIDTH] IN BLOOD BY AUTOMATED COUNT: 12 % (ref 11.5–14.5)
HCT VFR BLD AUTO: 35.9 % (ref 41–52)
HGB BLD-MCNC: 12.7 G/DL (ref 13.5–17.5)
IMM GRANULOCYTES # BLD AUTO: 0.02 X10*3/UL (ref 0–0.7)
IMM GRANULOCYTES NFR BLD AUTO: 0.4 % (ref 0–0.9)
INR PPP: 1 (ref 0.9–1.2)
LYMPHOCYTES # BLD AUTO: 1.21 X10*3/UL (ref 1.2–4.8)
LYMPHOCYTES NFR BLD AUTO: 24.1 %
MCH RBC QN AUTO: 33.2 PG (ref 26–34)
MCHC RBC AUTO-ENTMCNC: 35.4 G/DL (ref 32–36)
MCV RBC AUTO: 94 FL (ref 80–100)
MONOCYTES # BLD AUTO: 0.6 X10*3/UL (ref 0.1–1)
MONOCYTES NFR BLD AUTO: 11.9 %
NEUTROPHILS # BLD AUTO: 3.05 X10*3/UL (ref 1.2–7.7)
NEUTROPHILS NFR BLD AUTO: 60.6 %
NRBC BLD-RTO: 0 /100 WBCS (ref 0–0)
PLATELET # BLD AUTO: 236 X10*3/UL (ref 150–450)
PROTHROMBIN TIME: 11 SECONDS (ref 9.3–12.7)
RBC # BLD AUTO: 3.82 X10*6/UL (ref 4.5–5.9)
WBC # BLD AUTO: 5 X10*3/UL (ref 4.4–11.3)

## 2025-06-04 PROCEDURE — 2720000007 HC OR 272 NO HCPCS

## 2025-06-04 PROCEDURE — 36415 COLL VENOUS BLD VENIPUNCTURE: CPT | Performed by: STUDENT IN AN ORGANIZED HEALTH CARE EDUCATION/TRAINING PROGRAM

## 2025-06-04 PROCEDURE — 85097 BONE MARROW INTERPRETATION: CPT | Mod: WESLAB | Performed by: STUDENT IN AN ORGANIZED HEALTH CARE EDUCATION/TRAINING PROGRAM

## 2025-06-04 PROCEDURE — 85025 COMPLETE CBC W/AUTO DIFF WBC: CPT | Performed by: NURSE PRACTITIONER

## 2025-06-04 PROCEDURE — 77012 CT SCAN FOR NEEDLE BIOPSY: CPT

## 2025-06-04 PROCEDURE — 7100000010 HC PHASE TWO TIME - EACH INCREMENTAL 1 MINUTE

## 2025-06-04 PROCEDURE — 85610 PROTHROMBIN TIME: CPT | Performed by: NURSE PRACTITIONER

## 2025-06-04 PROCEDURE — 2500000004 HC RX 250 GENERAL PHARMACY W/ HCPCS (ALT 636 FOR OP/ED): Performed by: RADIOLOGY

## 2025-06-04 PROCEDURE — 36415 COLL VENOUS BLD VENIPUNCTURE: CPT | Performed by: NURSE PRACTITIONER

## 2025-06-04 PROCEDURE — 7100000009 HC PHASE TWO TIME - INITIAL BASE CHARGE

## 2025-06-04 RX ORDER — MIDAZOLAM HYDROCHLORIDE 1 MG/ML
INJECTION, SOLUTION INTRAMUSCULAR; INTRAVENOUS
Status: COMPLETED | OUTPATIENT
Start: 2025-06-04 | End: 2025-06-04

## 2025-06-04 RX ORDER — FENTANYL CITRATE 50 UG/ML
INJECTION, SOLUTION INTRAMUSCULAR; INTRAVENOUS
Status: COMPLETED | OUTPATIENT
Start: 2025-06-04 | End: 2025-06-04

## 2025-06-04 RX ADMIN — MIDAZOLAM 1 MG: 1 INJECTION INTRAMUSCULAR; INTRAVENOUS at 12:13

## 2025-06-04 RX ADMIN — FENTANYL CITRATE 50 MCG: 0.05 INJECTION, SOLUTION INTRAMUSCULAR; INTRAVENOUS at 12:13

## 2025-06-04 ASSESSMENT — PAIN SCALES - GENERAL
PAINLEVEL_OUTOF10: 0 - NO PAIN

## 2025-06-04 ASSESSMENT — PAIN - FUNCTIONAL ASSESSMENT: PAIN_FUNCTIONAL_ASSESSMENT: 0-10

## 2025-06-04 NOTE — DISCHARGE INSTRUCTIONS
FOR NEXT 24 HOURS  - Upon discharge, you should return home and rest for the remainder of the day and evening. You do not have to stay on bed rest but should not be very active.  It is recommended a responsible adult be with you for the first 24 hours after the procedure.     - No driving for 24 hours after procedure. Please arrange for someone to drive you home from the hospital today.     - Do not drive, operate machinery, or use power tools for 24 hours after your procedure.     - Do not make any legal decisions for 24 hours after your procedure.     - Do not drink alcoholic beverages for 24 hours after your procedure.    -No Lifting anything heavier than 10 pounds for 2-3 days.     -Please Monitor for signs and symptoms of infection at the sight such as abnormal drainage from the sight, Redness or swelling, or a Fever greater than 100.4.     - For 48 Hours Please hold NSAID medications such as Ibuprofen(Motrin), Naproxen (Aleve), or Aspirin.     - Please resume blood thinning medication 24 hours post procedure Coumadin (Warfarin), Apixaban (Eliquis), Rivaroxaban (Xarelto),  Prasugrel (Effient) .     - Okay to shower please do not submerge the area (bathing, swimming, Hot tubs) for 5-7 days.

## 2025-06-04 NOTE — Clinical Note
Bone marrow blood obtained and bone specimen also obtained.  Pt tolerated procedure well.  4x4 folded and clear tegaderm placed tolower back area.  Pt tolerated well.

## 2025-06-04 NOTE — Clinical Note
Pt proned himself on ct table and we secured him.  Placed on 3liters o2 nc and placed on EKG monitor.

## 2025-06-06 ENCOUNTER — TELEPHONE (OUTPATIENT)
Dept: DERMATOLOGY | Facility: CLINIC | Age: 65
End: 2025-06-06
Payer: MEDICARE

## 2025-06-06 NOTE — TELEPHONE ENCOUNTER
Surgical site: Left Upper Back  Date of procedure 5/21/2025    Patient informed about culture results which showed Staph Aureus. No further treatment needed but the patient was advised that the bacteria is sensitive to Kelflex and to continue the prescribed antibiotic course. The patient had no further concerns and was advised to call with any questions.

## 2025-06-08 LAB
BACTERIA SPEC AEROBE CULT: ABNORMAL
BACTERIA SPEC ANAEROBE CULT: ABNORMAL

## 2025-06-09 LAB
ELECTRONICALLY SIGNED BY: ABNORMAL
ELECTRONICALLY SIGNED BY: NORMAL
MYD88 L265P INTERPRETATION: ABNORMAL
MYD88 L265P INTERPRETATION: NORMAL
MYD88 L265P MUTATION RESULTS: ABNORMAL
MYD88 L265P MUTATION RESULTS: NOT DETECTED

## 2025-06-09 PROCEDURE — 81305 MYD88 GENE P.LEU265PRO VRNT: CPT | Performed by: STUDENT IN AN ORGANIZED HEALTH CARE EDUCATION/TRAINING PROGRAM

## 2025-06-09 PROCEDURE — G0452 MOLECULAR PATHOLOGY INTERPR: HCPCS | Performed by: STUDENT IN AN ORGANIZED HEALTH CARE EDUCATION/TRAINING PROGRAM

## 2025-06-10 ENCOUNTER — APPOINTMENT (OUTPATIENT)
Dept: DERMATOLOGY | Facility: CLINIC | Age: 65
End: 2025-06-10
Payer: MEDICARE

## 2025-06-10 ENCOUNTER — HOSPITAL ENCOUNTER (OUTPATIENT)
Dept: RADIOLOGY | Facility: HOSPITAL | Age: 65
Discharge: HOME | End: 2025-06-10
Payer: MEDICARE

## 2025-06-10 DIAGNOSIS — D47.2 GAMMOPATHY, MONOCLONAL: ICD-10-CM

## 2025-06-10 PROCEDURE — 74177 CT ABD & PELVIS W/CONTRAST: CPT

## 2025-06-10 PROCEDURE — 2550000001 HC RX 255 CONTRASTS: Performed by: STUDENT IN AN ORGANIZED HEALTH CARE EDUCATION/TRAINING PROGRAM

## 2025-06-10 RX ADMIN — IOHEXOL 75 ML: 350 INJECTION, SOLUTION INTRAVENOUS at 17:05

## 2025-06-11 LAB
CELL COUNT (BLOOD): 5.51 X10*3/UL
CELL POPULATIONS: NORMAL
CLINICAL SIG UPDATED INFO: NORMAL
DIAGNOSIS: NORMAL
FLOW DIFFERENTIAL: NORMAL
FLOW TEST ORDERED: NORMAL
LAB AP ASR DISCLAIMER: NORMAL
LAB AP BLOCK FOR ADDITIONAL STUDIES: NORMAL
LAB TEST METHOD: NORMAL
LABORATORY COMMENT REPORT: NORMAL
NUMBER OF CELLS COLLECTED: NORMAL
PATH REPORT.FINAL DX SPEC: NORMAL
PATH REPORT.GROSS SPEC: NORMAL
PATH REPORT.RELEVANT HX SPEC: NORMAL
PATH REPORT.TOTAL CANCER: NORMAL
PATH REPORT.TOTAL CANCER: NORMAL
PATHOLOGY SYNOPTIC REPORT: NORMAL
SIGNATURE COMMENT: NORMAL
SPECIMEN VIABILITY: NORMAL

## 2025-06-13 LAB
PATH REPORT.ADDENDUM SPEC: NORMAL
PATH REPORT.COMMENTS IMP SPEC: NORMAL
PATH REPORT.FINAL DX SPEC: NORMAL
PATH REPORT.GROSS SPEC: NORMAL
PATH REPORT.MICROSCOPIC SPEC OTHER STN: NORMAL
PATH REPORT.RELEVANT HX SPEC: NORMAL
PATH REPORT.TOTAL CANCER: NORMAL

## 2025-06-14 LAB — SCAN RESULT: NORMAL

## 2025-06-20 ENCOUNTER — APPOINTMENT (OUTPATIENT)
Dept: RADIOLOGY | Facility: HOSPITAL | Age: 65
End: 2025-06-20
Payer: MEDICARE

## 2025-06-20 LAB
ELECTRONICALLY SIGNED BY: NORMAL
LYMPHOID NGS RESULTS: NORMAL

## 2025-06-20 PROCEDURE — 81450 HL NEO GSAP 5-50DNA/DNA&RNA: CPT | Performed by: STUDENT IN AN ORGANIZED HEALTH CARE EDUCATION/TRAINING PROGRAM

## 2025-06-20 PROCEDURE — G0452 MOLECULAR PATHOLOGY INTERPR: HCPCS | Performed by: STUDENT IN AN ORGANIZED HEALTH CARE EDUCATION/TRAINING PROGRAM

## 2025-06-23 LAB
CHROM ANALY OVERALL INTERP-IMP: NORMAL
CHROM ANALY OVERALL INTERP-IMP: NORMAL
ELECTRONICALLY COSIGNED BY CYTOGENETICS: NORMAL
ELECTRONICALLY COSIGNED BY CYTOGENETICS: NORMAL
ELECTRONICALLY SIGNED BY CYTOGENETICS: NORMAL
ELECTRONICALLY SIGNED BY CYTOGENETICS: NORMAL
FISH ISCN RESULTS: NORMAL
FISH ISCN RESULTS: NORMAL

## 2025-06-23 PROCEDURE — 88271 CYTOGENETICS DNA PROBE: CPT | Performed by: STUDENT IN AN ORGANIZED HEALTH CARE EDUCATION/TRAINING PROGRAM

## 2025-06-23 PROCEDURE — 88291 CYTO/MOLECULAR REPORT: CPT | Performed by: PATHOLOGY

## 2025-06-24 ENCOUNTER — APPOINTMENT (OUTPATIENT)
Dept: RADIOLOGY | Facility: HOSPITAL | Age: 65
End: 2025-06-24
Payer: MEDICARE

## 2025-07-08 ENCOUNTER — OFFICE VISIT (OUTPATIENT)
Dept: HEMATOLOGY/ONCOLOGY | Facility: CLINIC | Age: 65
End: 2025-07-08
Payer: MEDICARE

## 2025-07-08 ENCOUNTER — TELEPHONE (OUTPATIENT)
Dept: HEMATOLOGY/ONCOLOGY | Facility: CLINIC | Age: 65
End: 2025-07-08
Payer: MEDICARE

## 2025-07-08 VITALS
DIASTOLIC BLOOD PRESSURE: 82 MMHG | WEIGHT: 196.65 LBS | TEMPERATURE: 97 F | RESPIRATION RATE: 18 BRPM | BODY MASS INDEX: 28.7 KG/M2 | HEART RATE: 74 BPM | OXYGEN SATURATION: 100 % | SYSTOLIC BLOOD PRESSURE: 152 MMHG

## 2025-07-08 DIAGNOSIS — D47.2 GAMMOPATHY, MONOCLONAL: ICD-10-CM

## 2025-07-08 PROCEDURE — 3077F SYST BP >= 140 MM HG: CPT | Performed by: STUDENT IN AN ORGANIZED HEALTH CARE EDUCATION/TRAINING PROGRAM

## 2025-07-08 PROCEDURE — 1126F AMNT PAIN NOTED NONE PRSNT: CPT | Performed by: STUDENT IN AN ORGANIZED HEALTH CARE EDUCATION/TRAINING PROGRAM

## 2025-07-08 PROCEDURE — 99215 OFFICE O/P EST HI 40 MIN: CPT | Performed by: STUDENT IN AN ORGANIZED HEALTH CARE EDUCATION/TRAINING PROGRAM

## 2025-07-08 PROCEDURE — 3079F DIAST BP 80-89 MM HG: CPT | Performed by: STUDENT IN AN ORGANIZED HEALTH CARE EDUCATION/TRAINING PROGRAM

## 2025-07-08 PROCEDURE — 1159F MED LIST DOCD IN RCRD: CPT | Performed by: STUDENT IN AN ORGANIZED HEALTH CARE EDUCATION/TRAINING PROGRAM

## 2025-07-08 PROCEDURE — G2211 COMPLEX E/M VISIT ADD ON: HCPCS | Performed by: STUDENT IN AN ORGANIZED HEALTH CARE EDUCATION/TRAINING PROGRAM

## 2025-07-08 PROCEDURE — 1036F TOBACCO NON-USER: CPT | Performed by: STUDENT IN AN ORGANIZED HEALTH CARE EDUCATION/TRAINING PROGRAM

## 2025-07-08 ASSESSMENT — PAIN SCALES - GENERAL: PAINLEVEL_OUTOF10: 0-NO PAIN

## 2025-07-08 NOTE — PROGRESS NOTES
Patient here for follow up visit with Dr. Uriostegui for IgM monoclonal antibody.  Patient here with his wife.     Medications and Allergies reviewed and reconciled this visit.    No concerns or complaints noted at this time.     Pt reports appetite isgood .     Follow up per MD request.    Pt. reports availability and use of mychart, Reviewed this is a good place to communicate with the team as well as review labs and upcoming orders.     No barriers to education noted, patient agrees to current plan and verbalized understanding using teach back method.

## 2025-07-08 NOTE — TELEPHONE ENCOUNTER
Reason for Conversation  Appointment    Background   Called and left a message for patient regarding his 8 a.m. appointment this morning that he did not show up for. Call back number provided.     Disposition   No disposition on file.

## 2025-07-09 NOTE — PROGRESS NOTES
Patient ID: Altaf Gaming is a 64 y.o. male.  Referring Physician: Le Uriostegui MD  [address] [place] [address]  [facility] [address]  [place] [address]  Primary Care Provider: Ed Murillo DO  Referral Reason: Ig M monoclonal protein    Interim history  Altaf returned to clinic today to review bone marrow biopsy report. No new complaints.     HPI:  Patient is 64 years old male with history of hypertension, peripheral neuropathy and new diagnosis of melanoma who came in for IgM monoclonal protein.  Patient reports that he had experience skin hypersensitivity 6 or 7 months ago, also along with pins and needle sensation in feet.  He saw Dr. Juarez neurology and did workup, he was found to have IgM kappa monoclonal band and a faint band in IgG kappa.  He also have completed EMG testing which consistent with mild to moderate left median mononeuropathy.  Patient reports that some of the neuropathy symptoms dissipated by itself.  Patient denies any weight loss, fatigue, night sweat, blurred vision, headache.  Reviewing the patient's labs, CBC and CMP are normal in December 2024, A1c 5.1, TSH normal.    Patient also reports that he has many skin lesions on his back and his primary care doctor treated with cryotherapy at the clinic and then recently one lesion got biopsied. The pathology came back consistent with melanoma.     FINAL DIAGNOSIS      PERIPHERAL BLOOD:  -- MINIMAL ANEMIA, NORMOCYTIC.     BONE MARROW, RIGHT ILIAC CREST, BIOPSY AND ASPIRATE WITH CLOT:  -- MONOCLONAL B-CELLS AND MONOCLONAL PLASMA DETECTED ON FLOW CYTOMETRY.  -- NORMOCELLULAR FOR AGE, COMPLETE, ORDERLY TRILINEAGE HEMATOPOIESIS.  -- NEGATIVE FOR AMYLOID DEPOSITION.  -- BLASTS NOT INCREASED.  -- See note.     Note:       The patient has an IgM kappa monoclonal gammopathy <3 g/dL.       Concurrent flow cytometry demonstrates a monoclonal, Kappa-restricted), CD5-negative, [id number] B-cell population in a background of polyclonal B-cells and a monoclonal  (kappa-restricted) CD20-positive, RN85-[id number] plasma cell population in a background of polyclonal plasma cells.       The combination of total B-cells (presumed to include monoclonal and polyclonal components, based on flow cytometry) and CD20-positive, monoclonal plasma cells is estimated at about 15-20% of marrow cellularity via immunostain for CD20. Background trilineage hematopoiesis appears normocellular for age without cytomorphologic changes suggestive of dysplasia. No amyloid deposition is detected on Congo red stain. Concurrent molecular testing yielded a low-level positive result for MYD88 L265P.        The primary differential diagnosis includes Lymphoplasmacytic Lymphoma (favored due to detection of MYD88 L265P), and Marginal Zone Lymphoma / non-CLL/SLL-type Monoclonal B-lymphocytosis. Clinical correlation is advised.      Correlation with pending karyotype, and FISH studies is advised. Lymphoid NGS panel will be attempted on clot to obtain CXCR4 status.      FISH for 17p, tp53, monosomy 7 negative     Past Medical History: Medical History[1]  Social History:   Social History     Socioeconomic History    Marital status:      Spouse name: Not on file    Number of children: Not on file    Years of education: Not on file    Highest education level: Not on file   Occupational History    Not on file   Tobacco Use    Smoking status: Never     Passive exposure: Never    Smokeless tobacco: Never   Vaping Use    Vaping status: Never Used   Substance and Sexual Activity    Alcohol use: Not Currently     Comment: rare    Drug use: Never    Sexual activity: Defer   Other Topics Concern    Not on file   Social History Narrative    Not on file     Social Drivers of Health     Financial Resource Strain: Not on file   Food Insecurity: Not on file   Transportation Needs: Not on file   Physical Activity: Not on file   Stress: Not on file   Social Connections: Not on file   Intimate Partner Violence: Not on file    Housing Stability: Not on file     Surgical History: Surgical History[2]  Family History: Family History[3]   reports that he has never smoked. He has never been exposed to tobacco smoke. He has never used smokeless tobacco.  Oncology Family history: Cancer-related family history includes Cancer in his father; Esophageal cancer in his father.    Review Of Systems:  General: no fatigue, weight change, appetite change  Ears/Nose/Mouth/Throat: no mouth sore, no hearing loss, no nasal congestion, no sore throat  Cardiovascular: no chest pain, no shortness of breath, no palpitation  Pulmonary: no cough, no wheezing, no hemoptysis  GI: no nausea, no vomiting, no diarrhea or constipation, no bleeding per rectum  Neurological: no dizziness, no seizure, no weakness, no sensation change  Musculoskeletal: no joint swelling, no bone pain, no back pain  Skin: no skin rash, no bruising, no skin lesion      Physical Exam:  /82 (BP Location: Right arm, Patient Position: Sitting, BP Cuff Size: Adult long)   Pulse 74   Temp 36.1 °C (97 °F) (Temporal)   Resp 18   Wt 89.2 kg (196 lb 10.4 oz)   SpO2 100%   BMI 28.70 kg/m²   BSA: 2.09 meters squared  General: awake/alert/oriented x3, no distress  Head: atraumatic. Symmetric facial expressions  Eyes: PERRL, EOMI, clear sclera.  Ears/Nose/Mouth/Throat:  Oral mucous membranes moist. No oral ulcers  Neck: No palpable cervical chain lymph nodes  Respiratory: unlabored breathing on room air, good chest expansion, clear breath sounds on both sides, no ronchi  Cardio: Regular rate and rhythm, normal S1 and S2, radial pulses symmetric  GI: Nondistended, soft, non-tender abdomen  Musculoskeletal: Normal muscle bulk and tone, ROM intact, no joint swelling.  Extremities: No pedal edema, no arm or leg wounds  Neuro: Alert, cognition intact, speech normal. No motor deficits noted. Sensation intact to touch and hot/cold.   Psychological: Appropriate mood and behavior.  Skin: Warm and dry,  no lesions, no rashes    Results:  Diagnostic Results   Lab Results   Component Value Date    WBC 5.0 06/04/2025    HGB 12.7 (L) 06/04/2025    HCT 35.9 (L) 06/04/2025    MCV 94 06/04/2025     06/04/2025     Lab Results   Component Value Date    CALCIUM 9.5 05/16/2025     (L) 05/16/2025    K 3.5 05/16/2025    CO2 26 05/16/2025    CL 93 (L) 05/16/2025    BUN 17 05/16/2025    CREATININE 1.20 05/16/2025    ALT 18 05/16/2025    AST 21 05/16/2025     Current Medications[4]     Radiology:  No results found.     Pathology:    Assessment/Plan:    IgM kappa gammopathy favoring LPL   - SPEP/IPEP  show monoclonal IgM kappa at 600 mg, kappa light chains at 5.12, lambda light chain 1.05 and free light chain ratio at 4.88, absolute IgM at 83  - Flow cytometry no evidence of lymphoproliferative disorder,  MYD88 detected  - CBC is largely in normal limit, LDH normal, total protein normal  - Due to peripheral neuropathy and skin lesions ( turned out to be melanoma), ruled out POEMS , VEGFR level normal,  anti-MAG antibody negative  - CT c/a/p 6/10/25 no lymphadenopathy or splenomegaly or hepatomegaly  - Bone marrow biopsy showed monoclonal B cell and monoclonal plasma cell, no dysplasia, blast not increased, negative of amyloid, low level MYD88 detected, CXCR4 pending, FISH negative for 17p, tp53, monosomy 7  - Given monoclonal Ig M protein, MYD88 positive and bone marrow findings, diagnosis favored lymphoplasmacytic lymphoma  - but no cytopenia or no other indication for treatment at this time, will continue observation, follow up in 4 months to repeat CBC, CMP, SPEP/IPEP, Ig levels  - today we reviewed all above test results with patient and discussed diagnosis and plan.    New diagnosis of melanoma cutaneous  - s/p wide excision On May 21, pathology reviewed  Dermatology recommend no further treatment required and continue skin check every 3 to 4 months    Performance Status: Symptomatic; fully ambulatory    I spent  more than 45 minutes for the patient today, including face-to-face conversation, pre-visit preparation, post-visit orders, and others.   Le Uriostegui MD                                [1]   Past Medical History:  Diagnosis Date    Encounter for screening for malignant neoplasm of prostate     Screening PSA (prostate specific antigen)    Hyperlipidemia     Hypertension     Other specified abnormal findings of blood chemistry 07/13/2016    Elevated serum creatinine    Personal history of other diseases of the circulatory system     History of hypertension    Personal history of other diseases of the circulatory system     Personal history of cardiac murmur    Personal history of other diseases of the digestive system     History of gastroesophageal reflux (GERD)    Personal history of other endocrine, nutritional and metabolic disease     History of hyperlipidemia   [2]   Past Surgical History:  Procedure Laterality Date    CYST REMOVAL  2023    Dr. Barnard - cyst located on neck    ESOPHAGOGASTRODUODENOSCOPY  02/11/2014    Diagnostic Esophagogastroduodenoscopy   [3]   Family History  Problem Relation Name Age of Onset    Heart disease Mother 82     Other (open heart surgery) Mother 82     Hypertension Father      Cancer Father      Esophageal cancer Father      Stroke Father     [4]   Current Outpatient Medications:     amLODIPine (Norvasc) 10 mg tablet, Take 1 tablet (10 mg) by mouth once daily., Disp: 90 tablet, Rfl: 2    atorvastatin (Lipitor) 20 mg tablet, Take 1 tablet (20 mg) by mouth once daily. 90 tablet, Disp: 90 tablet, Rfl: 3    hydroCHLOROthiazide (HYDRODiuril) 25 mg tablet, TAKE 1 TABLET DAILY, Disp: 90 tablet, Rfl: 3    losartan (Cozaar) 100 mg tablet, TAKE 1 TABLET DAILY, Disp: 90 tablet, Rfl: 3    metoprolol tartrate (Lopressor) 50 mg tablet, TAKE 1 TABLET TWICE A DAY, Disp: 180 tablet, Rfl: 3    amLODIPine (Norvasc) 10 mg tablet, Take 1 tablet (10 mg) by mouth once daily., Disp: 30 tablet, Rfl: 0

## 2025-07-10 LAB
PATH REPORT.ADDENDUM SPEC: NORMAL
PATH REPORT.ADDENDUM SPEC: NORMAL
PATH REPORT.COMMENTS IMP SPEC: NORMAL
PATH REPORT.FINAL DX SPEC: NORMAL
PATH REPORT.GROSS SPEC: NORMAL
PATH REPORT.MICROSCOPIC SPEC OTHER STN: NORMAL
PATH REPORT.RELEVANT HX SPEC: NORMAL
PATH REPORT.TOTAL CANCER: NORMAL

## 2025-07-14 ENCOUNTER — APPOINTMENT (OUTPATIENT)
Dept: NEUROLOGY | Facility: CLINIC | Age: 65
End: 2025-07-14
Payer: MEDICARE

## 2025-07-14 DIAGNOSIS — E78.00 PURE HYPERCHOLESTEROLEMIA: ICD-10-CM

## 2025-07-14 RX ORDER — ATORVASTATIN CALCIUM 20 MG/1
20 TABLET, FILM COATED ORAL DAILY
Qty: 90 TABLET | Refills: 3 | Status: SHIPPED | OUTPATIENT
Start: 2025-07-14 | End: 2026-07-14

## 2025-07-14 NOTE — TELEPHONE ENCOUNTER
Refill request from [facility] for Atorvastatin. Spoke with Altaf and appt scheduled for Dec with Dr. Ashley.

## 2025-07-18 LAB
BAND RESOLUTION: 450 BANDS
CHROM ANALY OVERALL INTERP-IMP: NORMAL
CHROMOSOME ANALYSIS CELLS ANALYZED: 20 CELLS
CHROMOSOME ANALYSIS CELLS IMAGED: 4 CELLS
CHROMOSOME ANALYSIS HYPERMODAL CELL COUNT: 0 CELLS
CHROMOSOME ANALYSIS HYPOMODAL CELL COUNT: 1 CELLS
CHROMOSOME ANALYSIS MODAL CHROMOSOME NO: 46 CHROMOSOMES
CHROMOSOME ANALYSIS STAINING METHOD: NORMAL
ELECTRONICALLY SIGNED BY CYTOGENETICS: NORMAL
KARYOTYP MAR: 2 CELLS
TOTAL CELLS COUNTED MAR: 20 CELLS

## 2025-07-18 PROCEDURE — 88280 CHROMOSOME KARYOTYPE STUDY: CPT | Performed by: STUDENT IN AN ORGANIZED HEALTH CARE EDUCATION/TRAINING PROGRAM
